# Patient Record
Sex: MALE | Race: WHITE | HISPANIC OR LATINO | Employment: FULL TIME | URBAN - METROPOLITAN AREA
[De-identification: names, ages, dates, MRNs, and addresses within clinical notes are randomized per-mention and may not be internally consistent; named-entity substitution may affect disease eponyms.]

---

## 2020-10-14 ENCOUNTER — CASE MANAGEMENT (OUTPATIENT)
Dept: INTERNAL MEDICINE | Age: 45
End: 2020-10-14

## 2020-10-14 DIAGNOSIS — Z23 NEED FOR IMMUNIZATION AGAINST INFLUENZA: ICD-10-CM

## 2020-10-14 PROCEDURE — 90471 IMMUNIZATION ADMIN: CPT | Performed by: FAMILY MEDICINE

## 2020-10-14 PROCEDURE — 90686 IIV4 VACC NO PRSV 0.5 ML IM: CPT | Performed by: FAMILY MEDICINE

## 2020-11-19 ENCOUNTER — OFFICE VISIT (OUTPATIENT)
Dept: URGENT CARE | Age: 45
End: 2020-11-19
Payer: COMMERCIAL

## 2020-11-19 VITALS
HEART RATE: 73 BPM | OXYGEN SATURATION: 97 % | TEMPERATURE: 98.3 F | SYSTOLIC BLOOD PRESSURE: 122 MMHG | RESPIRATION RATE: 18 BRPM | HEIGHT: 70 IN | BODY MASS INDEX: 27.92 KG/M2 | DIASTOLIC BLOOD PRESSURE: 88 MMHG | WEIGHT: 195 LBS

## 2020-11-19 DIAGNOSIS — Z20.822 CLOSE EXPOSURE TO COVID-19 VIRUS: Primary | ICD-10-CM

## 2020-11-19 DIAGNOSIS — R19.7 DIARRHEA, UNSPECIFIED TYPE: ICD-10-CM

## 2020-11-19 PROCEDURE — U0003 INFECTIOUS AGENT DETECTION BY NUCLEIC ACID (DNA OR RNA); SEVERE ACUTE RESPIRATORY SYNDROME CORONAVIRUS 2 (SARS-COV-2) (CORONAVIRUS DISEASE [COVID-19]), AMPLIFIED PROBE TECHNIQUE, MAKING USE OF HIGH THROUGHPUT TECHNOLOGIES AS DESCRIBED BY CMS-2020-01-R: HCPCS | Performed by: NURSE PRACTITIONER

## 2020-11-19 PROCEDURE — G0382 LEV 3 HOSP TYPE B ED VISIT: HCPCS | Performed by: NURSE PRACTITIONER

## 2020-11-21 LAB — SARS-COV-2 RNA SPEC QL NAA+PROBE: DETECTED

## 2020-11-22 ENCOUNTER — TELEPHONE (OUTPATIENT)
Dept: URGENT CARE | Age: 45
End: 2020-11-22

## 2020-12-01 ENCOUNTER — HOSPITAL ENCOUNTER (EMERGENCY)
Facility: HOSPITAL | Age: 45
Discharge: HOME | End: 2020-12-01
Attending: EMERGENCY MEDICINE
Payer: COMMERCIAL

## 2020-12-01 VITALS
OXYGEN SATURATION: 100 % | HEART RATE: 72 BPM | TEMPERATURE: 98.1 F | BODY MASS INDEX: 28.06 KG/M2 | SYSTOLIC BLOOD PRESSURE: 132 MMHG | RESPIRATION RATE: 18 BRPM | DIASTOLIC BLOOD PRESSURE: 93 MMHG | HEIGHT: 70 IN | WEIGHT: 195.99 LBS

## 2020-12-01 DIAGNOSIS — Z20.822 ENCOUNTER FOR LABORATORY TESTING FOR COVID-19 VIRUS: Primary | ICD-10-CM

## 2020-12-01 PROCEDURE — U0003 INFECTIOUS AGENT DETECTION BY NUCLEIC ACID (DNA OR RNA); SEVERE ACUTE RESPIRATORY SYNDROME CORONAVIRUS 2 (SARS-COV-2) (CORONAVIRUS DISEASE [COVID-19]), AMPLIFIED PROBE TECHNIQUE, MAKING USE OF HIGH THROUGHPUT TECHNOLOGIES AS DESCRIBED BY CMS-2020-01-R: HCPCS | Performed by: EMERGENCY MEDICINE

## 2020-12-01 PROCEDURE — 99283 EMERGENCY DEPT VISIT LOW MDM: CPT

## 2020-12-01 PROCEDURE — 99282 EMERGENCY DEPT VISIT SF MDM: CPT | Performed by: EMERGENCY MEDICINE

## 2020-12-02 LAB — SARS-COV-2 RNA SPEC QL NAA+PROBE: DETECTED

## 2021-11-12 ENCOUNTER — WALK IN (OUTPATIENT)
Dept: URGENT CARE | Age: 46
End: 2021-11-12

## 2021-11-12 VITALS
DIASTOLIC BLOOD PRESSURE: 62 MMHG | BODY MASS INDEX: 23.8 KG/M2 | SYSTOLIC BLOOD PRESSURE: 114 MMHG | HEIGHT: 71 IN | OXYGEN SATURATION: 98 % | TEMPERATURE: 98.2 F | WEIGHT: 170 LBS | HEART RATE: 66 BPM

## 2021-11-12 DIAGNOSIS — H10.12 ACUTE ATOPIC CONJUNCTIVITIS OF LEFT EYE: Primary | ICD-10-CM

## 2021-11-12 PROCEDURE — 99213 OFFICE O/P EST LOW 20 MIN: CPT | Performed by: NURSE PRACTITIONER

## 2021-11-12 RX ORDER — OFLOXACIN 3 MG/ML
2 SOLUTION/ DROPS OPHTHALMIC 4 TIMES DAILY
Qty: 5 ML | Refills: 0 | Status: SHIPPED | OUTPATIENT
Start: 2021-11-12 | End: 2021-11-19

## 2021-11-12 ASSESSMENT — VISUAL ACUITY
OD_CC: 20/25
OS_CC: 20/25

## 2025-04-25 ENCOUNTER — HOSPITAL ENCOUNTER (INPATIENT)
Facility: HOSPITAL | Age: 50
LOS: 3 days | Discharge: HOME | DRG: 419 | End: 2025-04-28
Attending: EMERGENCY MEDICINE | Admitting: SURGERY

## 2025-04-25 ENCOUNTER — APPOINTMENT (EMERGENCY)
Dept: ULTRASOUND IMAGING | Facility: HOSPITAL | Age: 50
DRG: 419 | End: 2025-04-25

## 2025-04-25 ENCOUNTER — APPOINTMENT (EMERGENCY)
Dept: CT IMAGING | Facility: HOSPITAL | Age: 50
DRG: 419 | End: 2025-04-25

## 2025-04-25 DIAGNOSIS — R94.31 ABNORMAL EKG: ICD-10-CM

## 2025-04-25 DIAGNOSIS — K80.50 CHOLEDOCHOLITHIASIS: ICD-10-CM

## 2025-04-25 DIAGNOSIS — G89.18 ACUTE POST-OPERATIVE PAIN: ICD-10-CM

## 2025-04-25 DIAGNOSIS — K80.42 CHOLEDOCHOLITHIASIS WITH ACUTE CHOLECYSTITIS: ICD-10-CM

## 2025-04-25 DIAGNOSIS — R10.13 EPIGASTRIC PAIN: Primary | ICD-10-CM

## 2025-04-25 LAB
ALBUMIN SERPL BCG-MCNC: 4.4 G/DL (ref 3.5–5)
ALP SERPL-CCNC: 71 U/L (ref 34–104)
ALT SERPL W P-5'-P-CCNC: 45 U/L (ref 7–52)
ANION GAP SERPL CALCULATED.3IONS-SCNC: 9 MMOL/L (ref 4–13)
AST SERPL W P-5'-P-CCNC: 59 U/L (ref 13–39)
BASOPHILS # BLD AUTO: 0.08 THOUSANDS/ÂΜL (ref 0–0.1)
BASOPHILS NFR BLD AUTO: 1 % (ref 0–1)
BILIRUB SERPL-MCNC: 1.45 MG/DL (ref 0.2–1)
BUN SERPL-MCNC: 13 MG/DL (ref 5–25)
CALCIUM SERPL-MCNC: 9.7 MG/DL (ref 8.4–10.2)
CARDIAC TROPONIN I PNL SERPL HS: 3 NG/L (ref ?–50)
CHLORIDE SERPL-SCNC: 103 MMOL/L (ref 96–108)
CO2 SERPL-SCNC: 29 MMOL/L (ref 21–32)
CREAT SERPL-MCNC: 1.32 MG/DL (ref 0.6–1.3)
EOSINOPHIL # BLD AUTO: 0.28 THOUSAND/ÂΜL (ref 0–0.61)
EOSINOPHIL NFR BLD AUTO: 2 % (ref 0–6)
ERYTHROCYTE [DISTWIDTH] IN BLOOD BY AUTOMATED COUNT: 12.7 % (ref 11.6–15.1)
GFR SERPL CREATININE-BSD FRML MDRD: 62 ML/MIN/1.73SQ M
GLUCOSE SERPL-MCNC: 112 MG/DL (ref 65–140)
HCT VFR BLD AUTO: 48.2 % (ref 36.5–49.3)
HGB BLD-MCNC: 15.9 G/DL (ref 12–17)
IMM GRANULOCYTES # BLD AUTO: 0.06 THOUSAND/UL (ref 0–0.2)
IMM GRANULOCYTES NFR BLD AUTO: 0 % (ref 0–2)
LIPASE SERPL-CCNC: 70 U/L (ref 11–82)
LYMPHOCYTES # BLD AUTO: 2.89 THOUSANDS/ÂΜL (ref 0.6–4.47)
LYMPHOCYTES NFR BLD AUTO: 20 % (ref 14–44)
MCH RBC QN AUTO: 30.2 PG (ref 26.8–34.3)
MCHC RBC AUTO-ENTMCNC: 33 G/DL (ref 31.4–37.4)
MCV RBC AUTO: 92 FL (ref 82–98)
MONOCYTES # BLD AUTO: 0.8 THOUSAND/ÂΜL (ref 0.17–1.22)
MONOCYTES NFR BLD AUTO: 6 % (ref 4–12)
NEUTROPHILS # BLD AUTO: 10.5 THOUSANDS/ÂΜL (ref 1.85–7.62)
NEUTS SEG NFR BLD AUTO: 71 % (ref 43–75)
NRBC BLD AUTO-RTO: 0 /100 WBCS
PLATELET # BLD AUTO: 354 THOUSANDS/UL (ref 149–390)
PMV BLD AUTO: 9.9 FL (ref 8.9–12.7)
POTASSIUM SERPL-SCNC: 4 MMOL/L (ref 3.5–5.3)
PROT SERPL-MCNC: 7.9 G/DL (ref 6.4–8.4)
RBC # BLD AUTO: 5.26 MILLION/UL (ref 3.88–5.62)
SODIUM SERPL-SCNC: 141 MMOL/L (ref 135–147)
WBC # BLD AUTO: 14.61 THOUSAND/UL (ref 4.31–10.16)

## 2025-04-25 PROCEDURE — 99284 EMERGENCY DEPT VISIT MOD MDM: CPT

## 2025-04-25 PROCEDURE — 96374 THER/PROPH/DIAG INJ IV PUSH: CPT

## 2025-04-25 PROCEDURE — 36415 COLL VENOUS BLD VENIPUNCTURE: CPT

## 2025-04-25 PROCEDURE — 84484 ASSAY OF TROPONIN QUANT: CPT

## 2025-04-25 PROCEDURE — 96361 HYDRATE IV INFUSION ADD-ON: CPT

## 2025-04-25 PROCEDURE — 83690 ASSAY OF LIPASE: CPT

## 2025-04-25 PROCEDURE — 80053 COMPREHEN METABOLIC PANEL: CPT

## 2025-04-25 PROCEDURE — 85025 COMPLETE CBC W/AUTO DIFF WBC: CPT

## 2025-04-25 PROCEDURE — 96375 TX/PRO/DX INJ NEW DRUG ADDON: CPT

## 2025-04-25 PROCEDURE — 74177 CT ABD & PELVIS W/CONTRAST: CPT

## 2025-04-25 PROCEDURE — 76705 ECHO EXAM OF ABDOMEN: CPT

## 2025-04-25 PROCEDURE — 93005 ELECTROCARDIOGRAM TRACING: CPT

## 2025-04-25 RX ORDER — HYDROMORPHONE HCL/PF 1 MG/ML
0.5 SYRINGE (ML) INJECTION ONCE
Status: COMPLETED | OUTPATIENT
Start: 2025-04-25 | End: 2025-04-25

## 2025-04-25 RX ORDER — HEPARIN SODIUM 5000 [USP'U]/ML
5000 INJECTION, SOLUTION INTRAVENOUS; SUBCUTANEOUS EVERY 8 HOURS SCHEDULED
Status: DISCONTINUED | OUTPATIENT
Start: 2025-04-25 | End: 2025-04-28 | Stop reason: HOSPADM

## 2025-04-25 RX ORDER — SUCRALFATE 1 G/1
1 TABLET ORAL 4 TIMES DAILY
Qty: 30 TABLET | Refills: 0 | Status: SHIPPED | OUTPATIENT
Start: 2025-04-25

## 2025-04-25 RX ORDER — ONDANSETRON 2 MG/ML
4 INJECTION INTRAMUSCULAR; INTRAVENOUS ONCE
Status: COMPLETED | OUTPATIENT
Start: 2025-04-25 | End: 2025-04-25

## 2025-04-25 RX ORDER — METRONIDAZOLE 500 MG/100ML
500 INJECTION, SOLUTION INTRAVENOUS EVERY 12 HOURS
Status: DISCONTINUED | OUTPATIENT
Start: 2025-04-25 | End: 2025-04-27

## 2025-04-25 RX ORDER — OXYCODONE HYDROCHLORIDE 10 MG/1
10 TABLET ORAL EVERY 4 HOURS PRN
Refills: 0 | Status: DISCONTINUED | OUTPATIENT
Start: 2025-04-25 | End: 2025-04-28 | Stop reason: HOSPADM

## 2025-04-25 RX ORDER — FAMOTIDINE 20 MG/1
20 TABLET, FILM COATED ORAL 2 TIMES DAILY
Qty: 30 TABLET | Refills: 0 | Status: SHIPPED | OUTPATIENT
Start: 2025-04-25

## 2025-04-25 RX ORDER — ACETAMINOPHEN 10 MG/ML
1000 INJECTION, SOLUTION INTRAVENOUS ONCE
Status: COMPLETED | OUTPATIENT
Start: 2025-04-25 | End: 2025-04-25

## 2025-04-25 RX ORDER — FAMOTIDINE 10 MG/ML
20 INJECTION, SOLUTION INTRAVENOUS ONCE
Status: COMPLETED | OUTPATIENT
Start: 2025-04-25 | End: 2025-04-25

## 2025-04-25 RX ORDER — OXYCODONE HYDROCHLORIDE 5 MG/1
5 TABLET ORAL EVERY 4 HOURS PRN
Refills: 0 | Status: DISCONTINUED | OUTPATIENT
Start: 2025-04-25 | End: 2025-04-28 | Stop reason: HOSPADM

## 2025-04-25 RX ORDER — ONDANSETRON 2 MG/ML
4 INJECTION INTRAMUSCULAR; INTRAVENOUS EVERY 6 HOURS PRN
Status: DISCONTINUED | OUTPATIENT
Start: 2025-04-25 | End: 2025-04-28 | Stop reason: HOSPADM

## 2025-04-25 RX ORDER — HYDROMORPHONE HCL IN WATER/PF 6 MG/30 ML
0.2 PATIENT CONTROLLED ANALGESIA SYRINGE INTRAVENOUS EVERY 4 HOURS PRN
Refills: 0 | Status: DISCONTINUED | OUTPATIENT
Start: 2025-04-25 | End: 2025-04-28 | Stop reason: HOSPADM

## 2025-04-25 RX ORDER — MAGNESIUM HYDROXIDE/ALUMINUM HYDROXICE/SIMETHICONE 120; 1200; 1200 MG/30ML; MG/30ML; MG/30ML
30 SUSPENSION ORAL ONCE
Status: COMPLETED | OUTPATIENT
Start: 2025-04-25 | End: 2025-04-25

## 2025-04-25 RX ORDER — ACETAMINOPHEN 325 MG/1
975 TABLET ORAL EVERY 8 HOURS SCHEDULED
Status: DISCONTINUED | OUTPATIENT
Start: 2025-04-25 | End: 2025-04-28 | Stop reason: HOSPADM

## 2025-04-25 RX ORDER — SODIUM CHLORIDE, SODIUM LACTATE, POTASSIUM CHLORIDE, CALCIUM CHLORIDE 600; 310; 30; 20 MG/100ML; MG/100ML; MG/100ML; MG/100ML
125 INJECTION, SOLUTION INTRAVENOUS CONTINUOUS
Status: DISCONTINUED | OUTPATIENT
Start: 2025-04-25 | End: 2025-04-27

## 2025-04-25 RX ORDER — KETOROLAC TROMETHAMINE 30 MG/ML
15 INJECTION, SOLUTION INTRAMUSCULAR; INTRAVENOUS ONCE
Status: DISCONTINUED | OUTPATIENT
Start: 2025-04-25 | End: 2025-04-25

## 2025-04-25 RX ADMIN — HYDROMORPHONE HYDROCHLORIDE 0.5 MG: 1 INJECTION, SOLUTION INTRAMUSCULAR; INTRAVENOUS; SUBCUTANEOUS at 18:13

## 2025-04-25 RX ADMIN — FAMOTIDINE 20 MG: 10 INJECTION, SOLUTION INTRAVENOUS at 18:25

## 2025-04-25 RX ADMIN — ALUMINUM HYDROXIDE, MAGNESIUM HYDROXIDE, AND DIMETHICONE 30 ML: 200; 20; 200 SUSPENSION ORAL at 18:26

## 2025-04-25 RX ADMIN — HYDROMORPHONE HYDROCHLORIDE 0.2 MG: 0.2 INJECTION, SOLUTION INTRAMUSCULAR; INTRAVENOUS; SUBCUTANEOUS at 23:00

## 2025-04-25 RX ADMIN — ONDANSETRON 4 MG: 2 INJECTION INTRAMUSCULAR; INTRAVENOUS at 18:13

## 2025-04-25 RX ADMIN — IOHEXOL 100 ML: 350 INJECTION, SOLUTION INTRAVENOUS at 19:47

## 2025-04-25 RX ADMIN — SODIUM CHLORIDE 1000 ML: 0.9 INJECTION, SOLUTION INTRAVENOUS at 18:25

## 2025-04-25 RX ADMIN — ACETAMINOPHEN 1000 MG: 10 INJECTION INTRAVENOUS at 18:26

## 2025-04-26 ENCOUNTER — APPOINTMENT (INPATIENT)
Dept: MRI IMAGING | Facility: HOSPITAL | Age: 50
DRG: 419 | End: 2025-04-26

## 2025-04-26 ENCOUNTER — APPOINTMENT (INPATIENT)
Dept: RADIOLOGY | Facility: HOSPITAL | Age: 50
DRG: 419 | End: 2025-04-26

## 2025-04-26 PROBLEM — K80.50 CHOLEDOCHOLITHIASIS: Status: ACTIVE | Noted: 2025-04-26

## 2025-04-26 LAB
ALBUMIN SERPL BCG-MCNC: 3.7 G/DL (ref 3.5–5)
ALP SERPL-CCNC: 97 U/L (ref 34–104)
ALT SERPL W P-5'-P-CCNC: 327 U/L (ref 7–52)
ANION GAP SERPL CALCULATED.3IONS-SCNC: 4 MMOL/L (ref 4–13)
AST SERPL W P-5'-P-CCNC: 343 U/L (ref 13–39)
BASOPHILS # BLD AUTO: 0.07 THOUSANDS/ÂΜL (ref 0–0.1)
BASOPHILS NFR BLD AUTO: 1 % (ref 0–1)
BILIRUB SERPL-MCNC: 1.25 MG/DL (ref 0.2–1)
BUN SERPL-MCNC: 12 MG/DL (ref 5–25)
CALCIUM SERPL-MCNC: 8.3 MG/DL (ref 8.4–10.2)
CHLORIDE SERPL-SCNC: 107 MMOL/L (ref 96–108)
CO2 SERPL-SCNC: 28 MMOL/L (ref 21–32)
CREAT SERPL-MCNC: 1.24 MG/DL (ref 0.6–1.3)
EOSINOPHIL # BLD AUTO: 0.21 THOUSAND/ÂΜL (ref 0–0.61)
EOSINOPHIL NFR BLD AUTO: 2 % (ref 0–6)
ERYTHROCYTE [DISTWIDTH] IN BLOOD BY AUTOMATED COUNT: 12.9 % (ref 11.6–15.1)
GFR SERPL CREATININE-BSD FRML MDRD: 67 ML/MIN/1.73SQ M
GLUCOSE SERPL-MCNC: 82 MG/DL (ref 65–140)
HCT VFR BLD AUTO: 42.6 % (ref 36.5–49.3)
HGB BLD-MCNC: 13.9 G/DL (ref 12–17)
IMM GRANULOCYTES # BLD AUTO: 0.03 THOUSAND/UL (ref 0–0.2)
IMM GRANULOCYTES NFR BLD AUTO: 0 % (ref 0–2)
LYMPHOCYTES # BLD AUTO: 3.54 THOUSANDS/ÂΜL (ref 0.6–4.47)
LYMPHOCYTES NFR BLD AUTO: 34 % (ref 14–44)
MAGNESIUM SERPL-MCNC: 2.2 MG/DL (ref 1.9–2.7)
MCH RBC QN AUTO: 30.8 PG (ref 26.8–34.3)
MCHC RBC AUTO-ENTMCNC: 32.6 G/DL (ref 31.4–37.4)
MCV RBC AUTO: 95 FL (ref 82–98)
MONOCYTES # BLD AUTO: 0.71 THOUSAND/ÂΜL (ref 0.17–1.22)
MONOCYTES NFR BLD AUTO: 7 % (ref 4–12)
NEUTROPHILS # BLD AUTO: 5.75 THOUSANDS/ÂΜL (ref 1.85–7.62)
NEUTS SEG NFR BLD AUTO: 56 % (ref 43–75)
NRBC BLD AUTO-RTO: 0 /100 WBCS
PLATELET # BLD AUTO: 281 THOUSANDS/UL (ref 149–390)
PMV BLD AUTO: 10.4 FL (ref 8.9–12.7)
POTASSIUM SERPL-SCNC: 4 MMOL/L (ref 3.5–5.3)
PROT SERPL-MCNC: 6.1 G/DL (ref 6.4–8.4)
RBC # BLD AUTO: 4.51 MILLION/UL (ref 3.88–5.62)
SODIUM SERPL-SCNC: 139 MMOL/L (ref 135–147)
WBC # BLD AUTO: 10.31 THOUSAND/UL (ref 4.31–10.16)

## 2025-04-26 PROCEDURE — 80053 COMPREHEN METABOLIC PANEL: CPT

## 2025-04-26 PROCEDURE — 99254 IP/OBS CNSLTJ NEW/EST MOD 60: CPT | Performed by: INTERNAL MEDICINE

## 2025-04-26 PROCEDURE — 74181 MRI ABDOMEN W/O CONTRAST: CPT

## 2025-04-26 PROCEDURE — 83735 ASSAY OF MAGNESIUM: CPT

## 2025-04-26 PROCEDURE — 85025 COMPLETE CBC W/AUTO DIFF WBC: CPT

## 2025-04-26 RX ORDER — CEFAZOLIN SODIUM 2 G/50ML
2000 SOLUTION INTRAVENOUS
Status: DISCONTINUED | OUTPATIENT
Start: 2025-04-27 | End: 2025-04-27

## 2025-04-26 RX ADMIN — METRONIDAZOLE 500 MG: 500 INJECTION, SOLUTION INTRAVENOUS at 22:27

## 2025-04-26 RX ADMIN — ACETAMINOPHEN 975 MG: 325 TABLET, FILM COATED ORAL at 00:00

## 2025-04-26 RX ADMIN — ACETAMINOPHEN 975 MG: 325 TABLET, FILM COATED ORAL at 06:23

## 2025-04-26 RX ADMIN — HEPARIN SODIUM 5000 UNITS: 5000 INJECTION INTRAVENOUS; SUBCUTANEOUS at 00:01

## 2025-04-26 RX ADMIN — SODIUM CHLORIDE, SODIUM LACTATE, POTASSIUM CHLORIDE, AND CALCIUM CHLORIDE 125 ML/HR: .6; .31; .03; .02 INJECTION, SOLUTION INTRAVENOUS at 20:15

## 2025-04-26 RX ADMIN — METRONIDAZOLE 500 MG: 500 INJECTION, SOLUTION INTRAVENOUS at 10:29

## 2025-04-26 RX ADMIN — HEPARIN SODIUM 5000 UNITS: 5000 INJECTION INTRAVENOUS; SUBCUTANEOUS at 06:23

## 2025-04-26 RX ADMIN — OXYCODONE HYDROCHLORIDE 10 MG: 10 TABLET ORAL at 00:00

## 2025-04-26 RX ADMIN — SODIUM CHLORIDE, SODIUM LACTATE, POTASSIUM CHLORIDE, AND CALCIUM CHLORIDE 125 ML/HR: .6; .31; .03; .02 INJECTION, SOLUTION INTRAVENOUS at 00:01

## 2025-04-26 RX ADMIN — HEPARIN SODIUM 5000 UNITS: 5000 INJECTION INTRAVENOUS; SUBCUTANEOUS at 13:09

## 2025-04-26 RX ADMIN — CEFTRIAXONE SODIUM 1000 MG: 10 INJECTION, POWDER, FOR SOLUTION INTRAVENOUS at 00:49

## 2025-04-26 RX ADMIN — METRONIDAZOLE 500 MG: 500 INJECTION, SOLUTION INTRAVENOUS at 00:01

## 2025-04-26 RX ADMIN — ACETAMINOPHEN 975 MG: 325 TABLET, FILM COATED ORAL at 13:08

## 2025-04-26 RX ADMIN — OXYCODONE HYDROCHLORIDE 10 MG: 10 TABLET ORAL at 08:26

## 2025-04-26 RX ADMIN — OXYCODONE HYDROCHLORIDE 10 MG: 10 TABLET ORAL at 20:15

## 2025-04-26 RX ADMIN — SODIUM CHLORIDE, SODIUM LACTATE, POTASSIUM CHLORIDE, AND CALCIUM CHLORIDE 125 ML/HR: .6; .31; .03; .02 INJECTION, SOLUTION INTRAVENOUS at 10:27

## 2025-04-26 NOTE — ED CARE HANDOFF
Emergency Department Sign Out Note        Sign out and transfer of care from Dr. Payan. See Separate Emergency Department note.     The patient, Hebert Fischer, was evaluated by the previous provider for upper abdominal pain.    Workup Completed:  Labs, symptomatic treatment    ED Course / Workup Pending (followup):  CT abdomen pelvis, disposition        No data recorded                          ED Course as of 04/26/25 0222 Fri Apr 25, 2025 2104 SO: epigastric abd pain, dry heaving. Pending CT scan. Improved symptoms. Referral to GI if d/c home.   2132 CT abdomen pelvis with contrast  IMPRESSION:     Punctate (1 to 2 mm) calculus in the region of the ampulla.  Trace intrahepatic biliary dilation. Normal CBD caliber.  Note of elevated bilirubin on today's labs.  GI consultation recommended.     Mild pericholecystic fluid and trace gallbladder wall thickening.  Clinical correlation advised for acute cholecystitis, right upper quadrant ultrasound could be considered.     Findings were discussed with Dr. Hayes from the ED at 9:20 p.m. on 4/25/2025 2233 Surgery to see   2256 US right upper quadrant  IMPRESSION:     Gallbladder wall thickening and pericholecystic fluid with a positive sonographic English's sign suspicious for acute cholecystitis.     Punctate calculus in the region of the ampulla described on CT abdomen and pelvis performed earlier is not visualized on this study. GI consultation is recommended.     Workstation performed: [id number]       Procedures  Medical Decision Making  Amount and/or Complexity of Data Reviewed  Labs: ordered.  Radiology: ordered. Decision-making details documented in ED Course.    Risk  OTC drugs.  Prescription drug management.  Decision regarding hospitalization.            Disposition  Final diagnoses:   Epigastric pain   Choledocholithiasis     Time reflects when diagnosis was documented in both MDM as applicable and the Disposition within this note       Time User  Action Codes Description Comment    4/25/2025  9:14 PM Hafsa Payan Add [R10.13] Epigastric pain     4/25/2025 10:45 PM Jay Wilson Add [K80.42] Choledocholithiasis with acute cholecystitis     4/25/2025 10:58 PM Ariela Coburn Add [K80.50] Choledocholithiasis           ED Disposition       ED Disposition   Admit    Condition   Stable    Date/Time   Fri Apr 25, 2025 10:58 PM    Comment   Case was discussed with Surgery and the patient's admission status was agreed to be Admission Status: inpatient status to the service of Dr. Brambila .               Follow-up Information       Follow up With Specialties Details Why Contact Info Additional Information    [facility] Schedule an appointment as soon as possible for a visit   [address] [facility] [address] [facility] [address]  [address]  [facility] [address] [phone] [facility] [address], [facility] [address], [place], [place], [address]   [phone]     [facility] Emergency Department Emergency Medicine Go to  If symptoms worsen [address] [facility] [address] [phone] [facility] Emergency Department, [facility] [place], [place], [address]          Current Discharge Medication List        START taking these medications    Details   famotidine (PEPCID) 20 mg tablet Take 1 tablet (20 mg total) by mouth 2 (two) times a day  Qty: 30 tablet, Refills: 0    Associated Diagnoses: Epigastric pain      sucralfate (CARAFATE) 1 g tablet Take 1 tablet (1 g total) by mouth 4 (four) times a day  Qty: 30 tablet, Refills: 0    Associated Diagnoses: Epigastric pain           CONTINUE these medications which have NOT CHANGED    Details   hydrOXYzine HCL (ATARAX) 25 mg tablet Take 1 tablet by mouth every 6 (six) hours  Qty: 12 tablet, Refills: 0                  ED Provider  Electronically Signed by     Ariela Coburn,  DO  04/26/25 0222

## 2025-04-26 NOTE — ED PROVIDER NOTES
Time reflects when diagnosis was documented in both MDM as applicable and the Disposition within this note       Time User Action Codes Description Comment    4/25/2025  9:14 PM Hafsa Payan Add [R10.13] Epigastric pain     4/25/2025 10:45 PM Jay Wilson Add [K80.42] Choledocholithiasis with acute cholecystitis     4/25/2025 10:58 PM Ariela Coburn Add [K80.50] Choledocholithiasis           ED Disposition       ED Disposition   Admit    Condition   Stable    Date/Time   Fri Apr 25, 2025 10:58 PM    Comment   Case was discussed with Surgery and the patient's admission status was agreed to be Admission Status: inpatient status to the service of Dr. Brambila .               Assessment & Plan       Medical Decision Making  50-year-old male patient presenting with epigastric abdominal pain and nausea.  On initial evaluation, he appeared quite ill and was tachycardic, crying, with some mild diaphoresis.  He did have some upper abdominal tenderness to palpation without guarding or rebound phenomenon.  Given degree of discomfort, he was immediately given medication for pain and nausea for supportive care.  Labs are drawn, EKG done.  EKG showed possible delta wave, but this is likely unrelated to his current presentation.  No acute ischemic changes were noted otherwise.  Labs showed mild elevation in hepatobiliary function test and CT scan of the abdomen and pelvis was done.  He did have significant improvement after administration of pain medication.  CT scan was pending at time of signout to Ariela Coburn, DO and disposition will depend on clinical improvement and any pathology discovered on imaging.    Amount and/or Complexity of Data Reviewed  Labs: ordered. Decision-making details documented in ED Course.  Radiology: ordered.    Risk  OTC drugs.  Prescription drug management.  Decision regarding hospitalization.        ED Course as of 04/26/25 1625   Fri Apr 25, 2025 2000 Creatinine(!): 1.32  Unclear if CKD or  acute change - no prior for comparison.       Medications   lactated ringers infusion (has no administration in time range)   heparin (porcine) subcutaneous injection 5,000 Units (has no administration in time range)   ondansetron (ZOFRAN) injection 4 mg (has no administration in time range)   acetaminophen (TYLENOL) tablet 975 mg (has no administration in time range)   oxyCODONE (ROXICODONE) IR tablet 5 mg (has no administration in time range)   oxyCODONE (ROXICODONE) immediate release tablet 10 mg (has no administration in time range)   HYDROmorphone HCl (DILAUDID) injection 0.2 mg (0.2 mg Intravenous Given 4/25/25 2300)   ceftriaxone (ROCEPHIN) 1 g/50 mL in dextrose IVPB (has no administration in time range)   metroNIDAZOLE (FLAGYL) IVPB (premix) 500 mg 100 mL (has no administration in time range)   sodium chloride 0.9 % bolus 1,000 mL (0 mL Intravenous Stopped 4/25/25 1925)   ondansetron (ZOFRAN) injection 4 mg (4 mg Intravenous Given 4/25/25 1813)   HYDROmorphone (DILAUDID) injection 0.5 mg (0.5 mg Intravenous Given 4/25/25 1813)   acetaminophen (Ofirmev) injection 1,000 mg (0 mg Intravenous Stopped 4/25/25 1841)   aluminum-magnesium hydroxide-simethicone (MAALOX) oral suspension 30 mL (30 mL Oral Given 4/25/25 1826)   Famotidine (PF) (PEPCID) injection 20 mg (20 mg Intravenous Given 4/25/25 1825)   iohexol (OMNIPAQUE) 350 MG/ML injection (MULTI-DOSE) 100 mL (100 mL Intravenous Given 4/25/25 1947)       ED Risk Strat Scores                    No data recorded        SBIRT 20yo+      Flowsheet Row Most Recent Value   Initial Alcohol Screen: US AUDIT-C     1. How often do you have a drink containing alcohol? 0 Filed at: 04/25/2025 1749   2. How many drinks containing alcohol do you have on a typical day you are drinking?  0 Filed at: 04/25/2025 1749   3a. Male UNDER 65: How often do you have five or more drinks on one occasion? 0 Filed at: 04/25/2025 1749   Audit-C Score 0 Filed at: 04/25/2025 1749   JERILYN: How  many times in the past year have you...    Used an illegal drug or used a prescription medication for non-medical reasons? Never Filed at: 04/25/2025 7395                            History of Present Illness       Chief Complaint   Patient presents with    Vomiting     Pts wife states pt ate a sandwich with eggs around 230pm and started vomiting after. C/o mid abd pain. Pt tearful, unable to sit still during triage.     Abdominal Pain       History reviewed. No pertinent past medical history.   History reviewed. No pertinent surgical history.   History reviewed. No pertinent family history.   Social History     Tobacco Use    Smoking status: Former    Smokeless tobacco: Never   Vaping Use    Vaping status: Never Used   Substance Use Topics    Alcohol use: No    Drug use: No      E-Cigarette/Vaping    E-Cigarette Use Never User       E-Cigarette/Vaping Substances      I have reviewed and agree with the history as documented.     HPI    Patient is a 50-year-old male with noncontributory past medical history presenting with worsening abdominal pain and nausea.  He states the pain started this afternoon around 3 PM after eating an egg salad sandwich for lunch.  He began having severe, burning epigastric/upper abdominal pain that has been unrelenting.  He is nauseous and not able to tolerate p.o. intake.  Denies any diarrhea or fevers.  Denies previous abdominal surgeries.    Review of Systems   Constitutional:  Positive for appetite change and diaphoresis. Negative for chills and fever.   HENT:  Negative for ear pain and sore throat.    Eyes:  Negative for pain and visual disturbance.   Respiratory:  Negative for cough and shortness of breath.    Cardiovascular:  Negative for chest pain and palpitations.   Gastrointestinal:  Positive for abdominal pain and nausea. Negative for vomiting.   Genitourinary:  Negative for dysuria and hematuria.   Musculoskeletal:  Negative for arthralgias and back pain.   Skin:  Negative  for color change and rash.   Neurological:  Negative for seizures and syncope.   All other systems reviewed and are negative.          Objective       ED Triage Vitals   Temperature Pulse Blood Pressure Respirations SpO2 Patient Position - Orthostatic VS   04/25/25 1750 04/25/25 1749 04/25/25 1749 04/25/25 1749 04/25/25 1749 04/25/25 1749   98.7 °F (37.1 °C) 97 154/95 20 100 % Sitting      Temp Source Heart Rate Source BP Location FiO2 (%) Pain Score    04/25/25 1750 04/25/25 1749 04/25/25 1749 -- 04/25/25 1749    Oral Monitor Left arm  10 - Worst Possible Pain      Vitals      Date and Time Temp Pulse SpO2 Resp BP Pain Score FACES Pain Rating User   04/26/25 1548 98.2 °F (36.8 °C) 50 96 % 18 123/70 -- -- DII   04/26/25 1021 98.1 °F (36.7 °C) 46 97 % 18 119/72 -- -- DII   04/26/25 0826 -- -- -- -- -- 7 -- DD   04/26/25 0705 -- -- -- -- -- 7 -- DD   04/26/25 0623 -- -- -- -- -- 3 -- DD   04/26/25 0000 -- -- -- -- -- No Pain -- CG   04/25/25 2255 97.9 °F (36.6 °C) 58 99 % 18 127/81 -- -- JF   04/25/25 1930 -- 63 97 % 20 124/81 -- -- LO   04/25/25 1900 -- 64 98 % 20 132/88 -- -- LO   04/25/25 1813 -- -- -- -- -- 10 - Worst Possible Pain -- KM   04/25/25 1750 98.7 °F (37.1 °C) -- -- -- -- -- -- LS   04/25/25 1749 -- 97 100 % 20 154/95 10 - Worst Possible Pain -- LS            Physical Exam  Vitals and nursing note reviewed.   Constitutional:       General: He is in acute distress.      Appearance: He is well-developed. He is ill-appearing.   HENT:      Head: Normocephalic and atraumatic.   Eyes:      Conjunctiva/sclera: Conjunctivae normal.   Cardiovascular:      Rate and Rhythm: Regular rhythm. Tachycardia present.      Heart sounds: No murmur heard.  Pulmonary:      Effort: Pulmonary effort is normal. No respiratory distress.      Breath sounds: Normal breath sounds.   Abdominal:      General: Abdomen is flat.      Palpations: Abdomen is soft.      Tenderness: There is abdominal tenderness in the right upper quadrant  and epigastric area. There is no guarding or rebound.   Musculoskeletal:         General: No swelling.      Cervical back: Neck supple.   Skin:     General: Skin is warm and dry.   Neurological:      General: No focal deficit present.      Mental Status: He is alert.      Comments: Crying   Psychiatric:         Mood and Affect: Mood normal.         Results Reviewed       Procedure Component Value Units Date/Time    Comprehensive metabolic panel [[id number]]  (Abnormal) Collected: 04/26/25 0454    Lab Status: Final result Specimen: Blood from Arm, Left Updated: 04/26/25 0621     Sodium 139 mmol/L      Potassium 4.0 mmol/L      Chloride 107 mmol/L      CO2 28 mmol/L      ANION GAP 4 mmol/L      BUN 12 mg/dL      Creatinine 1.24 mg/dL      Glucose 82 mg/dL      Calcium 8.3 mg/dL       U/L       U/L      Alkaline Phosphatase 97 U/L      Total Protein 6.1 g/dL      Albumin 3.7 g/dL      Total Bilirubin 1.25 mg/dL      eGFR 67 ml/min/1.73sq m     Narrative:      National Kidney Disease Foundation guidelines for Chronic Kidney Disease (CKD):     Stage 1 with normal or high GFR (GFR > 90 mL/min/1.73 square meters)    Stage 2 Mild CKD (GFR = 60-89 mL/min/1.73 square meters)    Stage 3A Moderate CKD (GFR = 45-59 mL/min/1.73 square meters)    Stage 3B Moderate CKD (GFR = 30-44 mL/min/1.73 square meters)    Stage 4 Severe CKD (GFR = 15-29 mL/min/1.73 square meters)    Stage 5 End Stage CKD (GFR <15 mL/min/1.73 square meters)  Note: GFR calculation is accurate only with a steady state creatinine    Magnesium [[id number]]  (Normal) Collected: 04/26/25 0454    Lab Status: Final result Specimen: Blood from Arm, Left Updated: 04/26/25 0621     Magnesium 2.2 mg/dL     CBC and differential [[id number]]  (Abnormal) Collected: 04/26/25 0454    Lab Status: Final result Specimen: Blood from Arm, Left Updated: 04/26/25 0545     WBC 10.31 Thousand/uL      RBC 4.51 Million/uL      Hemoglobin 13.9 g/dL      Hematocrit 42.6 %       MCV 95 fL      MCH 30.8 pg      MCHC 32.6 g/dL      RDW 12.9 %      MPV 10.4 fL      Platelets 281 Thousands/uL      nRBC 0 /100 WBCs      Segmented % 56 %      Immature Grans % 0 %      Lymphocytes % 34 %      Monocytes % 7 %      Eosinophils Relative 2 %      Basophils Relative 1 %      Absolute Neutrophils 5.75 Thousands/µL      Absolute Immature Grans 0.03 Thousand/uL      Absolute Lymphocytes 3.54 Thousands/µL      Absolute Monocytes 0.71 Thousand/µL      Eosinophils Absolute 0.21 Thousand/µL      Basophils Absolute 0.07 Thousands/µL     HS Troponin 0hr (reflex protocol) [[id number]]  (Normal) Collected: 04/25/25 1816    Lab Status: Final result Specimen: Blood from Arm, Right Updated: 04/25/25 1851     hs TnI 0hr 3 ng/L     Comprehensive metabolic panel [[id number]]  (Abnormal) Collected: 04/25/25 1816    Lab Status: Final result Specimen: Blood from Arm, Right Updated: 04/25/25 1846     Sodium 141 mmol/L      Potassium 4.0 mmol/L      Chloride 103 mmol/L      CO2 29 mmol/L      ANION GAP 9 mmol/L      BUN 13 mg/dL      Creatinine 1.32 mg/dL      Glucose 112 mg/dL      Calcium 9.7 mg/dL      AST 59 U/L      ALT 45 U/L      Alkaline Phosphatase 71 U/L      Total Protein 7.9 g/dL      Albumin 4.4 g/dL      Total Bilirubin 1.45 mg/dL      eGFR 62 ml/min/1.73sq m     Narrative:      National Kidney Disease Foundation guidelines for Chronic Kidney Disease (CKD):     Stage 1 with normal or high GFR (GFR > 90 mL/min/1.73 square meters)    Stage 2 Mild CKD (GFR = 60-89 mL/min/1.73 square meters)    Stage 3A Moderate CKD (GFR = 45-59 mL/min/1.73 square meters)    Stage 3B Moderate CKD (GFR = 30-44 mL/min/1.73 square meters)    Stage 4 Severe CKD (GFR = 15-29 mL/min/1.73 square meters)    Stage 5 End Stage CKD (GFR <15 mL/min/1.73 square meters)  Note: GFR calculation is accurate only with a steady state creatinine    Lipase [[id number]]  (Normal) Collected: 04/25/25 1816    Lab Status: Final result Specimen: Blood from Arm,  Right Updated: 04/25/25 1846     Lipase 70 u/L     CBC and differential [[id number]]  (Abnormal) Collected: 04/25/25 1816    Lab Status: Final result Specimen: Blood from Arm, Right Updated: 04/25/25 1835     WBC 14.61 Thousand/uL      RBC 5.26 Million/uL      Hemoglobin 15.9 g/dL      Hematocrit 48.2 %      MCV 92 fL      MCH 30.2 pg      MCHC 33.0 g/dL      RDW 12.7 %      MPV 9.9 fL      Platelets 354 Thousands/uL      nRBC 0 /100 WBCs      Segmented % 71 %      Immature Grans % 0 %      Lymphocytes % 20 %      Monocytes % 6 %      Eosinophils Relative 2 %      Basophils Relative 1 %      Absolute Neutrophils 10.50 Thousands/µL      Absolute Immature Grans 0.06 Thousand/uL      Absolute Lymphocytes 2.89 Thousands/µL      Absolute Monocytes 0.80 Thousand/µL      Eosinophils Absolute 0.28 Thousand/µL      Basophils Absolute 0.08 Thousands/µL             MRI abdomen wo contrast and mrcp   Final Interpretation by Soco Vasquez MD (04/26 1157)      Mildly distended gallbladder with mild gallbladder wall thickening with pericholecystic fluid, suspicious for acute cholecystitis particularly given the positive sonographic English sign on right upper quadrant ultrasound 4/25/2025.      No biliary ductal dilatation or choledocholithiasis.         Workstation performed: [id number]         XR follow up   Final Interpretation by Chaz Wright MD (04/26 0714)      No radiopaque orbital foreign body.         Workstation performed: [id number]         US right upper quadrant   Final Interpretation by Edita Fortune MD (04/25 0476)      Gallbladder wall thickening and pericholecystic fluid with a positive sonographic English's sign suspicious for acute cholecystitis.      Punctate calculus in the region of the ampulla described on CT abdomen and pelvis performed earlier is not visualized on this study. GI consultation is recommended.      Workstation performed: [id number]         CT abdomen pelvis with contrast    Final Interpretation by Indio Loaiza MD (04/25 2124)      Punctate (1 to 2 mm) calculus in the region of the ampulla.   Trace intrahepatic biliary dilation. Normal CBD caliber.   Note of elevated bilirubin on today's labs.   GI consultation recommended.      Mild pericholecystic fluid and trace gallbladder wall thickening.   Clinical correlation advised for acute cholecystitis, right upper quadrant ultrasound could be considered.      Findings were discussed with Dr. Hayes from the ED at 9:20 p.m. on 4/25/2025            Workstation performed: [id number]             ECG 12 Lead Documentation Only    Date/Time: 4/25/2025 8:00 PM    Performed by: Hafsa Payan DO  Authorized by: Hafsa Payan DO    Indications / Diagnosis:  Abdominal pain  Patient location:  ED  Interpretation:     Interpretation: normal    Rate:     ECG rate:  72    ECG rate assessment: normal    Rhythm:     Rhythm: sinus rhythm    Ectopy:     Ectopy: none    QRS:     QRS axis:  Normal  Conduction:     Conduction: normal    ST segments:     ST segments:  Normal  T waves:     T waves: normal    Other findings:     Other findings: delta wave    Comments:      Possible appearance of delta wave in leads V3 through V6, II, I.       ED Medication and Procedure Management   Prior to Admission Medications   Prescriptions Last Dose Informant Patient Reported? Taking?   hydrOXYzine HCL (ATARAX) 25 mg tablet   No No   Sig: Take 1 tablet by mouth every 6 (six) hours   Patient not taking: Reported on 11/19/2020      Facility-Administered Medications: None     Current Discharge Medication List        START taking these medications    Details   famotidine (PEPCID) 20 mg tablet Take 1 tablet (20 mg total) by mouth 2 (two) times a day  Qty: 30 tablet, Refills: 0    Associated Diagnoses: Epigastric pain      sucralfate (CARAFATE) 1 g tablet Take 1 tablet (1 g total) by mouth 4 (four) times a day  Qty: 30 tablet, Refills:  0    Associated Diagnoses: Epigastric pain           CONTINUE these medications which have NOT CHANGED    Details   hydrOXYzine HCL (ATARAX) 25 mg tablet Take 1 tablet by mouth every 6 (six) hours  Qty: 12 tablet, Refills: 0             ED SEPSIS DOCUMENTATION   Time reflects when diagnosis was documented in both MDM as applicable and the Disposition within this note       Time User Action Codes Description Comment    4/25/2025  9:14 PM Hafsa Payan Add [R10.13] Epigastric pain     4/25/2025 10:45 PM Jay Wilson Add [K80.42] Choledocholithiasis with acute cholecystitis     4/25/2025 10:58 PM Ariela Coburn Add [K80.50] Choledocholithiasis                  Hafsa Payan DO  04/26/25 8845

## 2025-04-26 NOTE — H&P
H&P - Surgery-General   Name: Hebert Fischer 50 y.o. male I MRN: [id number]  Unit/Bed#: S -01 I Date of Admission: 4/25/2025   Date of Service: 4/26/2025 I Hospital Day: 1     Assessment & Plan  Choledocholithiasis  - Admit to general surgery  - NPO MN  - LR @ 125  - Rocephin/Flagyl  - GI consulted. MRCP pending  - Lap Angelica this admission  - DVT ppx with SQH  - Trend T bili, WBC  - Encourage OOB, ambulation, IS    History of Present Illness   Hebert Fischer is a 50 y.o. male who presents with one day of abdominal pain. The pain is located in his epigastrium. It does not radiate. No alleviating or aggravating factors. No prior similar episodes. PSH includes right inguinal hernia repair. Not on AC/AP medication. No recent EGD. Denies noticing jaundice, pale stool, or dark colored urine. Labs notable for leukocytosis and T Bilie of 1.4 CT AP obtained with punctate (1 to 2 mm) calculus in the region of the ampulla. Trace intrahepatic biliary dilation. Normal CBD caliber. Mild pericholecystic fluid and trace gallbladder wall thickening. On exam, abdomen is soft, tender in the epigastrium, no rebound or guarding.    Review of Systems  Review of systems negative except as per HPI.     Medical History Review: I have reviewed the patient's PMH, PSH, Social History, Family History, Meds, and Allergies   Historical Information   History reviewed. No pertinent past medical history.  History reviewed. No pertinent surgical history.  Social History     Tobacco Use    Smoking status: Former    Smokeless tobacco: Never   Vaping Use    Vaping status: Never Used   Substance and Sexual Activity    Alcohol use: No    Drug use: No    Sexual activity: Not on file     E-Cigarette/Vaping    E-Cigarette Use Never User      E-Cigarette/Vaping Substances     History reviewed. No pertinent family history.  Social History     Tobacco Use    Smoking status: Former    Smokeless tobacco: Never   Vaping Use    Vaping status: Never Used    Substance and Sexual Activity    Alcohol use: No    Drug use: No    Sexual activity: Not on file       Current Facility-Administered Medications:     acetaminophen (TYLENOL) tablet 975 mg, Q8H DOLORES    ceftriaxone (ROCEPHIN) 1 g/50 mL in dextrose IVPB, Q24H, Last Rate: 1,000 mg (04/26/25 0049)    heparin (porcine) subcutaneous injection 5,000 Units, Q8H DOLORES    HYDROmorphone HCl (DILAUDID) injection 0.2 mg, Q4H PRN    lactated ringers infusion, Continuous, Last Rate: 125 mL/hr (04/26/25 0001)    metroNIDAZOLE (FLAGYL) IVPB (premix) 500 mg 100 mL, Q12H, Last Rate: 500 mg (04/26/25 0001)    ondansetron (ZOFRAN) injection 4 mg, Q6H PRN    oxyCODONE (ROXICODONE) immediate release tablet 10 mg, Q4H PRN    oxyCODONE (ROXICODONE) IR tablet 5 mg, Q4H PRN  Prior to Admission Medications   Prescriptions Last Dose Informant Patient Reported? Taking?   hydrOXYzine HCL (ATARAX) 25 mg tablet   No No   Sig: Take 1 tablet by mouth every 6 (six) hours   Patient not taking: Reported on 11/19/2020      Facility-Administered Medications: None     Patient has no known allergies.    Objective :  Temp:  [97.9 °F (36.6 °C)-98.7 °F (37.1 °C)] 97.9 °F (36.6 °C)  HR:  [58-97] 58  BP: (124-154)/(81-95) 127/81  Resp:  [18-20] 18  SpO2:  [97 %-100 %] 99 %  O2 Device: None (Room air)      Physical Exam:  General: No acute distress, alert and oriented  CV: Well perfused, regular rate  Lungs: Normal work of breathing, no increased respiratory effort  Abdomen: Soft, tender in epigastrium, no rebound or guarding  Extremities: No edema, clubbing or cyanosis  Skin: Warm, dry      Lab Results: I have reviewed the following results:  Recent Labs     04/25/25  1816   WBC 14.61*   HGB 15.9   HCT 48.2      SODIUM 141   K 4.0      CO2 29   BUN 13   CREATININE 1.32*   GLUC 112   AST 59*   ALT 45   ALB 4.4   TBILI 1.45*   ALKPHOS 71   HSTNI0 3       Imaging Results Review: I personally reviewed the following image studies in PACS and associated  radiology reports: CT abdomen/pelvis. My interpretation of the radiology images/reports is: choledocholithiasis, cholecystitis.  Other Study Results Review: No additional pertinent studies reviewed.    VTE Pharmacologic Prophylaxis: VTE covered by:  heparin (porcine), Subcutaneous, 5,000 Units at 04/26/25 0001      VTE Mechanical Prophylaxis: sequential compression device    Jay Wilson MD  General Surgery   04/26/25

## 2025-04-26 NOTE — CONSULTS
Consultation - Gastroenterology   Name: Hebert Fischer 50 y.o. male I MRN: [id number]  Unit/Bed#: S -01 I Date of Admission: 4/25/2025   Date of Service: 4/26/2025 I Hospital Day: 1   Inpatient consult to gastroenterology  Consult performed by: Jessy Callahan PA-C  Consult ordered by: Jay Wilson MD        Physician Requesting Evaluation: Mildred Brambila DO   Reason for Evaluation / Principal Problem: Suspected choledocholithiasis    Assessment & Plan  Choledocholithiasis  Appears to have been transient, bilirubin trended down this morning and although initial CT scan was suspicious for calculus at the level of the ampulla, MRI appears to be negative in this regard, with no biliary ductal dilatation noted either.    -Previous imaging studies including CT scan and ultrasound were reviewed at this time    - Continue to monitor liver enzymes    - Timing and prospect of cholecystectomy as per general surgery, consider intraoperative cholangiogram    - He will also need outpatient colonoscopy for screening purposes, so we can contact our schedulers to get in touch with the patient to set this up nonurgently      History of Present Illness   HPI:  Hebert Fischer is a 50 y.o. male reporting no significant medical history who presented to the hospital yesterday with complaint of approximately 1 day of epigastric abdominal pain, he said it was severe and burning in nature, starting around 3 PM, about an hour after eating a sandwich with some ham and eggs.  He denied any vomiting or hematemesis, denies any change in bowel habits throughout this ordeal.    When he came to the hospital was noted with white count of 14, which is 10 today, he is being treated with IV antibiotics, he is afebrile.  His initial CT scan showed findings suspicious for ampullary stone, follow-up ultrasound did not demonstrate this although there were findings concerning for acute cholecystitis, he is being planned for  cholecystectomy.  These imaging studies were followed up with MRI which has been completed and does not appear to show evidence of a CBD stone, his CBD also appears to be normal caliber.    He has never had colonoscopy or EGD.  No known family history of colon cancer.      Review of Systems   Constitutional:  Negative for activity change, appetite change, chills, fatigue, fever and unexpected weight change.   HENT:  Negative for congestion, nosebleeds, rhinorrhea, sore throat and trouble swallowing.    Eyes:  Negative for pain, itching and visual disturbance.   Respiratory:  Negative for cough, shortness of breath and wheezing.    Cardiovascular:  Negative for chest pain, palpitations and leg swelling.   Gastrointestinal:  Negative for constipation, diarrhea, nausea and vomiting.   Endocrine: Negative for cold intolerance, heat intolerance and polyuria.   Genitourinary:  Negative for difficulty urinating, dysuria, flank pain, frequency and hematuria.   Musculoskeletal:  Negative for arthralgias, back pain, myalgias and neck pain.   Skin:  Negative for color change, pallor and rash.   Neurological:  Negative for dizziness, speech difficulty, weakness, numbness and headaches.   Hematological:  Does not bruise/bleed easily.   Psychiatric/Behavioral:  Negative for dysphoric mood and sleep disturbance. The patient is not nervous/anxious.      Historical Information   History reviewed. No pertinent past medical history.  History reviewed. No pertinent surgical history.  Social History     Tobacco Use    Smoking status: Former    Smokeless tobacco: Never   Vaping Use    Vaping status: Never Used   Substance and Sexual Activity    Alcohol use: No    Drug use: No    Sexual activity: Not on file     E-Cigarette/Vaping    E-Cigarette Use Never User      E-Cigarette/Vaping Substances       Social History     Tobacco Use    Smoking status: Former    Smokeless tobacco: Never   Vaping Use    Vaping status: Never Used   Substance  and Sexual Activity    Alcohol use: No    Drug use: No    Sexual activity: Not on file       Current Facility-Administered Medications:     acetaminophen (TYLENOL) tablet 975 mg, Q8H DOLORES    [START ON 4/27/2025] ceFAZolin (ANCEF) IVPB (premix in dextrose) 2,000 mg 50 mL, On Call To OR    ceftriaxone (ROCEPHIN) 1 g/50 mL in dextrose IVPB, Q24H, Last Rate: 1,000 mg (04/26/25 0049)    heparin (porcine) subcutaneous injection 5,000 Units, Q8H DOLORES    HYDROmorphone HCl (DILAUDID) injection 0.2 mg, Q4H PRN    lactated ringers infusion, Continuous, Last Rate: 125 mL/hr (04/26/25 1027)    metroNIDAZOLE (FLAGYL) IVPB (premix) 500 mg 100 mL, Q12H, Last Rate: 500 mg (04/26/25 1029)    ondansetron (ZOFRAN) injection 4 mg, Q6H PRN    oxyCODONE (ROXICODONE) immediate release tablet 10 mg, Q4H PRN    oxyCODONE (ROXICODONE) IR tablet 5 mg, Q4H PRN  Prior to Admission Medications   Prescriptions Last Dose Informant Patient Reported? Taking?   hydrOXYzine HCL (ATARAX) 25 mg tablet   No No   Sig: Take 1 tablet by mouth every 6 (six) hours   Patient not taking: Reported on 11/19/2020      Facility-Administered Medications: None     Patient has no known allergies.    Objective :  Temp:  [97.9 °F (36.6 °C)-98.7 °F (37.1 °C)] 98.1 °F (36.7 °C)  HR:  [46-97] 46  BP: (119-154)/(72-95) 119/72  Resp:  [18-20] 18  SpO2:  [97 %-100 %] 97 %  O2 Device: None (Room air)    Physical Exam  Constitutional:       General: He is not in acute distress.     Appearance: He is well-developed. He is not diaphoretic.   HENT:      Head: Normocephalic and atraumatic.   Eyes:      Conjunctiva/sclera: Conjunctivae normal.      Pupils: Pupils are equal, round, and reactive to light.   Cardiovascular:      Rate and Rhythm: Normal rate and regular rhythm.      Heart sounds: Normal heart sounds. No murmur heard.     No friction rub. No gallop.   Pulmonary:      Effort: Pulmonary effort is normal. No respiratory distress.      Breath sounds: Normal breath sounds. No  stridor. No wheezing or rales.   Abdominal:      General: Bowel sounds are normal. There is no distension.      Palpations: Abdomen is soft. There is no mass.      Tenderness: There is no abdominal tenderness. There is no guarding or rebound.   Musculoskeletal:         General: No tenderness. Normal range of motion.      Cervical back: Normal range of motion and neck supple.   Skin:     General: Skin is warm and dry.      Coloration: Skin is not pale.      Findings: No erythema or rash.   Neurological:      Mental Status: He is alert and oriented to person, place, and time.   Psychiatric:         Behavior: Behavior normal.           Lab Results: I have reviewed the following results:

## 2025-04-26 NOTE — ASSESSMENT & PLAN NOTE
Appears to have been transient, bilirubin trended down this morning and although initial CT scan was suspicious for calculus at the level of the ampulla, MRI appears to be negative in this regard, with no biliary ductal dilatation noted either.    -Previous imaging studies including CT scan and ultrasound were reviewed at this time    - Continue to monitor liver enzymes    - Timing and prospect of cholecystectomy as per general surgery, consider intraoperative cholangiogram    - He will also need outpatient colonoscopy for screening purposes, so we can contact our schedulers to get in touch with the patient to set this up nonurgently

## 2025-04-26 NOTE — ASSESSMENT & PLAN NOTE
- Admit to general surgery  - NPO MN  - LR @ 125  - Rocephin/Flagyl  - GI consulted. MRCP pending  - Lap Angelica this admission  - DVT ppx with SQH  - Trend T bili, WBC  - Encourage OOB, ambulation, IS

## 2025-04-26 NOTE — PLAN OF CARE
Problem: PAIN - ADULT  Goal: Verbalizes/displays adequate comfort level or baseline comfort level  Description: Interventions:- Encourage patient to monitor pain and request assistance- Assess pain using appropriate pain scale- Administer analgesics based on type and severity of pain and evaluate response- Implement non-pharmacological measures as appropriate and evaluate response- Consider cultural and social influences on pain and pain management- Notify physician/advanced practitioner if interventions unsuccessful or patient reports new pain  Outcome: Progressing     Problem: INFECTION - ADULT  Goal: Absence or prevention of progression during hospitalization  Description: INTERVENTIONS:- Assess and monitor for signs and symptoms of infection- Monitor lab/diagnostic results- Monitor all insertion sites, i.e. indwelling lines, tubes, and drains- Monitor endotracheal if appropriate and nasal secretions for changes in amount and color- Benson appropriate cooling/warming therapies per order- Administer medications as ordered- Instruct and encourage patient and family to use good hand hygiene technique- Identify and instruct in appropriate isolation precautions for identified infection/condition  Outcome: Progressing  Goal: Absence of fever/infection during neutropenic period  Description: INTERVENTIONS:- Monitor WBC  Outcome: Progressing

## 2025-04-26 NOTE — UTILIZATION REVIEW
Initial Clinical Review    Admission: Date/Time/Statement:   Admission Orders (From admission, onward)       Ordered        04/25/25 2243  Inpatient Admission  Once                          Orders Placed This Encounter   Procedures    Inpatient Admission     Standing Status:   Standing     Number of Occurrences:   1     Level of Care:   Med Surg [16]     Estimated length of stay:   More than 2 Midnights     Certification:   I certify that inpatient services are medically necessary for this patient for a duration of greater than two midnights. See H&P and MD Progress Notes for additional information about the patient's course of treatment.     ED Arrival Information       Expected   -    Arrival   4/25/2025 17:37    Acuity   Urgent              Means of arrival   Walk-In    Escorted by   Family Member    Service   Surgery-General    Admission type   Emergency              Arrival complaint   Food Poisoning             Chief Complaint   Patient presents with    Vomiting     Pts wife states pt ate a sandwich with eggs around 230pm and started vomiting after. C/o mid abd pain. Pt tearful, unable to sit still during triage.     Abdominal Pain       Initial Presentation: 50 y.o. male  to ED via walk in from home.    Admitted to inpatient with Dx: Choledocholithiasis.   Presented to ED with worsening abdominal pain and  nausea starting about 2.5 hours prior to arrival after eating egg salad for lunch.  Unable to tolerate po. PMHx: noncontributory. On exam:  acute distress. Appears ill.  Tachycardia.  Abdominal tenderness in RUQ and epigastric area. crying.  AST 59>343.  ALT 45>327.  Total bili 1.25.  wbc 4.61>10.31.    bun 13. Creatinine 1.32.   CT AP obtained with punctate (1 to 2 mm) calculus in the region of the ampulla. Trace intrahepatic biliary dilation. Normal CBD caliber. Mild pericholecystic fluid and trace gallbladder wall thickening.    ED treatment:  IVF bolus, Zofran, 4 doses of IV Analgesia.    Plan includes   to admit to surgery.   NPO at MN.  IVF. IV antibiotics.  Consult GI.  MRCP.  Trend total bili and  +WBC.     Date: 4/26/25   Day 2:  today with worsening LFTs.  MRCP revealing mildly distended gallbladder with mild gallbladder wall thickening with pericholecystic fluid suspicious for acute cholecystitis, but no biliary ductal dilation or choledocholithiasis.  On exam tender in epigastrium.   Continue IVF, pain control, IV antibiotics. Likely lap olimpia tomorrow.     Per GI 4/26/25:  differential includes possible choledocholithiasis with evolving biliary obstruction.  Continue supportive care,  trend LFTs. No ERCP at this time.   Likely Lap olimpia.      ED Treatment-Medication Administration from 04/25/2025 1736 to 04/25/2025 2309         Date/Time Order Dose Route Action     04/25/2025 1825 sodium chloride 0.9 % bolus 1,000 mL 1,000 mL Intravenous New Bag     04/25/2025 1813 ondansetron (ZOFRAN) injection 4 mg 4 mg Intravenous Given     04/25/2025 1813 HYDROmorphone (DILAUDID) injection 0.5 mg 0.5 mg Intravenous Given     04/25/2025 1826 acetaminophen (Ofirmev) injection 1,000 mg 1,000 mg Intravenous New Bag     04/25/2025 1826 aluminum-magnesium hydroxide-simethicone (MAALOX) oral suspension 30 mL 30 mL Oral Given     04/25/2025 1825 Famotidine (PF) (PEPCID) injection 20 mg 20 mg Intravenous Given     04/25/2025 2300 HYDROmorphone HCl (DILAUDID) injection 0.2 mg 0.2 mg Intravenous Given            Scheduled Medications:  acetaminophen, 975 mg, Oral, Q8H DOLORES  [START ON 4/27/2025] cefazolin, 2,000 mg, Intravenous, On Call To OR  cefTRIAXone, 1,000 mg, Intravenous, Q24H  heparin (porcine), 5,000 Units, Subcutaneous, Q8H DOLORES  metroNIDAZOLE, 500 mg, Intravenous, Q12H    Continuous IV Infusions:  lactated ringers, 125 mL/hr, Intravenous, Continuous      PRN Meds:  HYDROmorphone, 0.2 mg, Intravenous, Q4H PRN - x 1 at 2300 on 4/25/25  ondansetron, 4 mg, Intravenous, Q6H PRN  oxyCODONE, 10 mg, Oral, Q4H PRN x 2 thus far 4/26  at 0000 and 0826  oxyCODONE, 5 mg, Oral, Q4H PRN      ED Triage Vitals   Temperature Pulse Respirations Blood Pressure SpO2 Pain Score   04/25/25 1750 04/25/25 1749 04/25/25 1749 04/25/25 1749 04/25/25 1749 04/25/25 1749   98.7 °F (37.1 °C) 97 20 154/95 100 % 10 - Worst Possible Pain     Weight (last 2 days)       Date/Time Weight    04/25/25 2252 88 (194.01)          Vital Signs (last 3 days)       Date/Time Temp Pulse Resp BP MAP (mmHg) SpO2 O2 Device Patient Position - Orthostatic VS Pain    04/26/25 15:48:16 98.2 °F (36.8 °C) 50 18 123/70 88 96 % -- -- --    04/26/25 10:21:47 98.1 °F (36.7 °C) 46 18 119/72 88 97 % -- -- --    04/26/25 0826 -- -- -- -- -- -- -- -- 7    04/26/25 0705 -- -- -- -- -- -- None (Room air) -- 7    04/26/25 0623 -- -- -- -- -- -- -- -- 3    04/26/25 0000 -- -- -- -- -- -- -- -- No Pain    04/25/25 2255 97.9 °F (36.6 °C) 58 18 127/81 -- 99 % None (Room air) Lying --    04/25/25 1930 -- 63 20 124/81 96 97 % None (Room air) Lying --    04/25/25 1900 -- 64 20 132/88 106 98 % None (Room air) Lying --    04/25/25 1813 -- -- -- -- -- -- -- -- 10 - Worst Possible Pain    04/25/25 1750 98.7 °F (37.1 °C) -- -- -- -- -- -- -- --    04/25/25 1749 -- 97 20 154/95 -- 100 % None (Room air) Sitting 10 - Worst Possible Pain          Pertinent Labs/Diagnostic Test Results:   Radiology:  MRI abdomen wo contrast and mrcp   Final Interpretation by Soco Vasquez MD (04/26 1500)      Mildly distended gallbladder with mild gallbladder wall thickening with pericholecystic fluid, suspicious for acute cholecystitis particularly given the positive sonographic English sign on right upper quadrant ultrasound 4/25/2025.      No biliary ductal dilatation or choledocholithiasis.         Workstation performed: [id number]         XR follow up   Final Interpretation by Chaz Wright MD (04/26 5535)      No radiopaque orbital foreign body.         Workstation performed: [id number]         US right upper  quadrant   Final Interpretation by Edita Fortune MD (04/25 2246)      Gallbladder wall thickening and pericholecystic fluid with a positive sonographic English's sign suspicious for acute cholecystitis.      Punctate calculus in the region of the ampulla described on CT abdomen and pelvis performed earlier is not visualized on this study. GI consultation is recommended.      Workstation performed: [id number]         CT abdomen pelvis with contrast   Final Interpretation by Indio Loaiza MD (04/25 2124)      Punctate (1 to 2 mm) calculus in the region of the ampulla.   Trace intrahepatic biliary dilation. Normal CBD caliber.   Note of elevated bilirubin on today's labs.   GI consultation recommended.      Mild pericholecystic fluid and trace gallbladder wall thickening.   Clinical correlation advised for acute cholecystitis, right upper quadrant ultrasound could be considered.      Findings were discussed with Dr. Hayes from the ED at 9:20 p.m. on 4/25/2025            Workstation performed: [id number]           Cardiology:  ECG 12 lead    by Interface, Ris Results In (04/25 1811)        ECG 12 Lead Documentation Only   Date/Time: 4/25/2025 8:00 PM  Indications / Diagnosis:  Abdominal pain  Patient location:  ED  Interpretation:     Interpretation: normal    Rate:     ECG rate:  72    ECG rate assessment: normal    Rhythm:     Rhythm: sinus rhythm    Ectopy:     Ectopy: none    QRS:     QRS axis:  Normal  Conduction:     Conduction: normal    ST segments:     ST segments:  Normal  T waves:     T waves: normal    Other findings:     Other findings: delta wave    Comments:      Possible appearance of delta wave in leads V3 through V6, II, I.     GI:  No orders to display     Results from last 7 days   Lab Units 04/26/25  0454 04/25/25  1816   WBC Thousand/uL 10.31* 14.61*   HEMOGLOBIN g/dL 13.9 15.9   HEMATOCRIT % 42.6 48.2   PLATELETS Thousands/uL 281 354   TOTAL NEUT ABS Thousands/µL 5.75 10.50*      Results from last 7 days   Lab Units 04/26/25  0454 04/25/25  1816   SODIUM mmol/L 139 141   POTASSIUM mmol/L 4.0 4.0   CHLORIDE mmol/L 107 103   CO2 mmol/L 28 29   ANION GAP mmol/L 4 9   BUN mg/dL 12 13   CREATININE mg/dL 1.24 1.32*   EGFR ml/min/1.73sq m 67 62   CALCIUM mg/dL 8.3* 9.7   MAGNESIUM mg/dL 2.2  --      Results from last 7 days   Lab Units 04/26/25  0454 04/25/25  1816   AST U/L 343* 59*   ALT U/L 327* 45   ALK PHOS U/L 97 71   TOTAL PROTEIN g/dL 6.1* 7.9   ALBUMIN g/dL 3.7 4.4   TOTAL BILIRUBIN mg/dL 1.25* 1.45*     Results from last 7 days   Lab Units 04/26/25  0454 04/25/25  1816   GLUCOSE RANDOM mg/dL 82 112     Results from last 7 days   Lab Units 04/25/25  1816   HS TNI 0HR ng/L 3     Results from last 7 days   Lab Units 04/25/25  1816   LIPASE u/L 70     History reviewed. No pertinent past medical history.  Present on Admission:   Choledocholithiasis      Admitting Diagnosis: Choledocholithiasis [K80.50]  Choledocholithiasis with acute cholecystitis [K80.42]  Vomiting [R11.10]  Epigastric pain [R10.13]  Abdominal pain [R10.9]  Age/Sex: 50 y.o. male    Network Utilization Review Department  ATTENTION: Please call with any questions or concerns to [phone] and carefully listen to the prompts so that you are directed to the right person. All voicemails are confidential.   For Discharge needs, contact Care Management DC Support Team at [phone] opt. 2  Send all requests for admission clinical reviews, approved or denied determinations and any other requests to dedicated fax number below belonging to the campus where the patient is receiving treatment. List of dedicated fax numbers for the Facilities:  FACILITY NAME UR FAX NUMBER   ADMISSION DENIALS (Administrative/Medical Necessity) [phone]   DISCHARGE SUPPORT TEAM (NETWORK) [phone]   PARENT CHILD HEALTH (Maternity/NICU/Pediatrics) [phone]   [facility] [phone]   [facility] [phone]   [facility] [phone]   [facility] [phone]   [facility] [phone]   [facility] [phone]   [facility] [phone]   [facility] [phone]   [facility] [phone]   [facility] [phone]   [facility] [phone]   [facility] [phone]

## 2025-04-27 ENCOUNTER — ANESTHESIA EVENT (INPATIENT)
Dept: PERIOP | Facility: HOSPITAL | Age: 50
DRG: 419 | End: 2025-04-27

## 2025-04-27 ENCOUNTER — ANESTHESIA (INPATIENT)
Dept: PERIOP | Facility: HOSPITAL | Age: 50
DRG: 419 | End: 2025-04-27

## 2025-04-27 ENCOUNTER — APPOINTMENT (INPATIENT)
Dept: RADIOLOGY | Facility: HOSPITAL | Age: 50
DRG: 419 | End: 2025-04-27

## 2025-04-27 LAB
ALBUMIN SERPL BCG-MCNC: 3.5 G/DL (ref 3.5–5)
ALP SERPL-CCNC: 87 U/L (ref 34–104)
ALT SERPL W P-5'-P-CCNC: 193 U/L (ref 7–52)
ANION GAP SERPL CALCULATED.3IONS-SCNC: 6 MMOL/L (ref 4–13)
AST SERPL W P-5'-P-CCNC: 84 U/L (ref 13–39)
ATRIAL RATE: 72 BPM
BILIRUB SERPL-MCNC: 0.64 MG/DL (ref 0.2–1)
BUN SERPL-MCNC: 15 MG/DL (ref 5–25)
CALCIUM SERPL-MCNC: 8.4 MG/DL (ref 8.4–10.2)
CHLORIDE SERPL-SCNC: 108 MMOL/L (ref 96–108)
CO2 SERPL-SCNC: 25 MMOL/L (ref 21–32)
CREAT SERPL-MCNC: 1.24 MG/DL (ref 0.6–1.3)
ERYTHROCYTE [DISTWIDTH] IN BLOOD BY AUTOMATED COUNT: 12.7 % (ref 11.6–15.1)
GFR SERPL CREATININE-BSD FRML MDRD: 67 ML/MIN/1.73SQ M
GLUCOSE SERPL-MCNC: 87 MG/DL (ref 65–140)
HCT VFR BLD AUTO: 42.2 % (ref 36.5–49.3)
HGB BLD-MCNC: 13.4 G/DL (ref 12–17)
MAGNESIUM SERPL-MCNC: 2.1 MG/DL (ref 1.9–2.7)
MCH RBC QN AUTO: 30 PG (ref 26.8–34.3)
MCHC RBC AUTO-ENTMCNC: 31.8 G/DL (ref 31.4–37.4)
MCV RBC AUTO: 94 FL (ref 82–98)
P AXIS: -20 DEGREES
PHOSPHATE SERPL-MCNC: 3.7 MG/DL (ref 2.7–4.5)
PLATELET # BLD AUTO: 256 THOUSANDS/UL (ref 149–390)
PMV BLD AUTO: 10 FL (ref 8.9–12.7)
POTASSIUM SERPL-SCNC: 4.2 MMOL/L (ref 3.5–5.3)
PR INTERVAL: 120 MS
PROT SERPL-MCNC: 5.9 G/DL (ref 6.4–8.4)
QRS AXIS: 55 DEGREES
QRSD INTERVAL: 106 MS
QT INTERVAL: 370 MS
QTC INTERVAL: 405 MS
RBC # BLD AUTO: 4.47 MILLION/UL (ref 3.88–5.62)
SODIUM SERPL-SCNC: 139 MMOL/L (ref 135–147)
T WAVE AXIS: 47 DEGREES
VENTRICULAR RATE: 72 BPM
WBC # BLD AUTO: 8.99 THOUSAND/UL (ref 4.31–10.16)

## 2025-04-27 PROCEDURE — 88304 TISSUE EXAM BY PATHOLOGIST: CPT | Performed by: STUDENT IN AN ORGANIZED HEALTH CARE EDUCATION/TRAINING PROGRAM

## 2025-04-27 PROCEDURE — 93010 ELECTROCARDIOGRAM REPORT: CPT | Performed by: INTERNAL MEDICINE

## 2025-04-27 PROCEDURE — 83735 ASSAY OF MAGNESIUM: CPT

## 2025-04-27 PROCEDURE — 80053 COMPREHEN METABOLIC PANEL: CPT

## 2025-04-27 PROCEDURE — 85027 COMPLETE CBC AUTOMATED: CPT

## 2025-04-27 PROCEDURE — 84100 ASSAY OF PHOSPHORUS: CPT

## 2025-04-27 PROCEDURE — 0FT44ZZ RESECTION OF GALLBLADDER, PERCUTANEOUS ENDOSCOPIC APPROACH: ICD-10-PCS | Performed by: SURGERY

## 2025-04-27 RX ORDER — SODIUM CHLORIDE, SODIUM LACTATE, POTASSIUM CHLORIDE, CALCIUM CHLORIDE 600; 310; 30; 20 MG/100ML; MG/100ML; MG/100ML; MG/100ML
125 INJECTION, SOLUTION INTRAVENOUS CONTINUOUS
Status: DISCONTINUED | OUTPATIENT
Start: 2025-04-27 | End: 2025-04-27

## 2025-04-27 RX ORDER — SUCCINYLCHOLINE/SOD CL,ISO/PF 100 MG/5ML
SYRINGE (ML) INTRAVENOUS AS NEEDED
Status: DISCONTINUED | OUTPATIENT
Start: 2025-04-27 | End: 2025-04-27

## 2025-04-27 RX ORDER — DEXAMETHASONE SODIUM PHOSPHATE 10 MG/ML
INJECTION, SOLUTION INTRAMUSCULAR; INTRAVENOUS AS NEEDED
Status: DISCONTINUED | OUTPATIENT
Start: 2025-04-27 | End: 2025-04-27

## 2025-04-27 RX ORDER — HYDROMORPHONE HCL/PF 1 MG/ML
0.5 SYRINGE (ML) INJECTION
Status: DISCONTINUED | OUTPATIENT
Start: 2025-04-27 | End: 2025-04-27 | Stop reason: HOSPADM

## 2025-04-27 RX ORDER — SODIUM CHLORIDE 9 MG/ML
INJECTION, SOLUTION INTRAVENOUS AS NEEDED
Status: DISCONTINUED | OUTPATIENT
Start: 2025-04-27 | End: 2025-04-27 | Stop reason: HOSPADM

## 2025-04-27 RX ORDER — FENTANYL CITRATE 50 UG/ML
INJECTION, SOLUTION INTRAMUSCULAR; INTRAVENOUS AS NEEDED
Status: DISCONTINUED | OUTPATIENT
Start: 2025-04-27 | End: 2025-04-27

## 2025-04-27 RX ORDER — MIDAZOLAM HYDROCHLORIDE 2 MG/2ML
INJECTION, SOLUTION INTRAMUSCULAR; INTRAVENOUS AS NEEDED
Status: DISCONTINUED | OUTPATIENT
Start: 2025-04-27 | End: 2025-04-27

## 2025-04-27 RX ORDER — PROPOFOL 10 MG/ML
INJECTION, EMULSION INTRAVENOUS AS NEEDED
Status: DISCONTINUED | OUTPATIENT
Start: 2025-04-27 | End: 2025-04-27

## 2025-04-27 RX ORDER — ONDANSETRON 2 MG/ML
4 INJECTION INTRAMUSCULAR; INTRAVENOUS ONCE AS NEEDED
Status: DISCONTINUED | OUTPATIENT
Start: 2025-04-27 | End: 2025-04-27 | Stop reason: HOSPADM

## 2025-04-27 RX ORDER — FENTANYL CITRATE/PF 50 MCG/ML
50 SYRINGE (ML) INJECTION
Status: COMPLETED | OUTPATIENT
Start: 2025-04-27 | End: 2025-04-27

## 2025-04-27 RX ORDER — CEFEPIME HYDROCHLORIDE 2 G/50ML
INJECTION, SOLUTION INTRAVENOUS AS NEEDED
Status: DISCONTINUED | OUTPATIENT
Start: 2025-04-27 | End: 2025-04-27

## 2025-04-27 RX ORDER — LIDOCAINE HCL/PF 100 MG/5ML
SYRINGE (ML) INJECTION AS NEEDED
Status: DISCONTINUED | OUTPATIENT
Start: 2025-04-27 | End: 2025-04-27

## 2025-04-27 RX ORDER — HYDROMORPHONE HCL/PF 1 MG/ML
SYRINGE (ML) INJECTION AS NEEDED
Status: DISCONTINUED | OUTPATIENT
Start: 2025-04-27 | End: 2025-04-27

## 2025-04-27 RX ORDER — ROCURONIUM BROMIDE 10 MG/ML
INJECTION, SOLUTION INTRAVENOUS AS NEEDED
Status: DISCONTINUED | OUTPATIENT
Start: 2025-04-27 | End: 2025-04-27

## 2025-04-27 RX ORDER — ONDANSETRON 2 MG/ML
INJECTION INTRAMUSCULAR; INTRAVENOUS AS NEEDED
Status: DISCONTINUED | OUTPATIENT
Start: 2025-04-27 | End: 2025-04-27

## 2025-04-27 RX ADMIN — ROCURONIUM 10 MG: 50 INJECTION, SOLUTION INTRAVENOUS at 10:19

## 2025-04-27 RX ADMIN — HYDROMORPHONE HYDROCHLORIDE 0.5 MG: 1 INJECTION, SOLUTION INTRAMUSCULAR; INTRAVENOUS; SUBCUTANEOUS at 12:24

## 2025-04-27 RX ADMIN — PROPOFOL 160 MG: 10 INJECTION, EMULSION INTRAVENOUS at 10:19

## 2025-04-27 RX ADMIN — FENTANYL CITRATE 50 MCG: 50 INJECTION INTRAMUSCULAR; INTRAVENOUS at 12:04

## 2025-04-27 RX ADMIN — FENTANYL CITRATE 25 MCG: 50 INJECTION INTRAMUSCULAR; INTRAVENOUS at 11:36

## 2025-04-27 RX ADMIN — SUGAMMADEX 200 MG: 100 INJECTION, SOLUTION INTRAVENOUS at 11:37

## 2025-04-27 RX ADMIN — HYDROMORPHONE HYDROCHLORIDE 0.2 MG: 0.2 INJECTION, SOLUTION INTRAMUSCULAR; INTRAVENOUS; SUBCUTANEOUS at 00:13

## 2025-04-27 RX ADMIN — LIDOCAINE HYDROCHLORIDE 80 MG: 20 INJECTION INTRAVENOUS at 10:19

## 2025-04-27 RX ADMIN — FENTANYL CITRATE 25 MCG: 50 INJECTION INTRAMUSCULAR; INTRAVENOUS at 11:43

## 2025-04-27 RX ADMIN — CEFEPIME HYDROCHLORIDE 2000 MG: 2 INJECTION, SOLUTION INTRAVENOUS at 10:22

## 2025-04-27 RX ADMIN — HEPARIN SODIUM 5000 UNITS: 5000 INJECTION INTRAVENOUS; SUBCUTANEOUS at 21:31

## 2025-04-27 RX ADMIN — ACETAMINOPHEN 975 MG: 325 TABLET, FILM COATED ORAL at 14:30

## 2025-04-27 RX ADMIN — DEXAMETHASONE SODIUM PHOSPHATE 10 MG: 10 INJECTION INTRAMUSCULAR; INTRAVENOUS at 10:21

## 2025-04-27 RX ADMIN — FENTANYL CITRATE 50 MCG: 50 INJECTION INTRAMUSCULAR; INTRAVENOUS at 10:19

## 2025-04-27 RX ADMIN — ACETAMINOPHEN 975 MG: 325 TABLET, FILM COATED ORAL at 21:30

## 2025-04-27 RX ADMIN — CEFTRIAXONE SODIUM 1000 MG: 10 INJECTION, POWDER, FOR SOLUTION INTRAVENOUS at 00:13

## 2025-04-27 RX ADMIN — ROCURONIUM 40 MG: 50 INJECTION, SOLUTION INTRAVENOUS at 10:30

## 2025-04-27 RX ADMIN — Medication 100 MG: at 10:19

## 2025-04-27 RX ADMIN — HEPARIN SODIUM 5000 UNITS: 5000 INJECTION INTRAVENOUS; SUBCUTANEOUS at 06:43

## 2025-04-27 RX ADMIN — MIDAZOLAM 2 MG: 1 INJECTION INTRAMUSCULAR; INTRAVENOUS at 10:13

## 2025-04-27 RX ADMIN — OXYCODONE HYDROCHLORIDE 5 MG: 5 TABLET ORAL at 14:31

## 2025-04-27 RX ADMIN — HYDROMORPHONE HYDROCHLORIDE 1 MG: 1 INJECTION, SOLUTION INTRAMUSCULAR; INTRAVENOUS; SUBCUTANEOUS at 10:35

## 2025-04-27 RX ADMIN — FENTANYL CITRATE 50 MCG: 50 INJECTION INTRAMUSCULAR; INTRAVENOUS at 12:19

## 2025-04-27 RX ADMIN — OXYCODONE HYDROCHLORIDE 5 MG: 5 TABLET ORAL at 21:30

## 2025-04-27 RX ADMIN — HYDROMORPHONE HYDROCHLORIDE 0.2 MG: 0.2 INJECTION, SOLUTION INTRAMUSCULAR; INTRAVENOUS; SUBCUTANEOUS at 18:50

## 2025-04-27 RX ADMIN — HEPARIN SODIUM 5000 UNITS: 5000 INJECTION INTRAVENOUS; SUBCUTANEOUS at 14:31

## 2025-04-27 RX ADMIN — ACETAMINOPHEN 975 MG: 325 TABLET, FILM COATED ORAL at 06:43

## 2025-04-27 RX ADMIN — ONDANSETRON 4 MG: 2 INJECTION INTRAMUSCULAR; INTRAVENOUS at 11:37

## 2025-04-27 NOTE — PROGRESS NOTES
Progress Note - Surgery-General   Name: Hebert Fischer 50 y.o. male I MRN: [id number]  Unit/Bed#: S -01 I Date of Admission: 4/25/2025   Date of Service: 4/27/2025 I Hospital Day: 2     Assessment & Plan  Choledocholithiasis  50-year-old male with concern for choledocholithiasis    4/26 MRCP: Mildly distended gallbladder with mild gallbladder wall thickening with pericholecystic fluid, suspicious for acute cholecystitis, no biliary ductal dilatation or choledocholithiasis.    Afebrile, vital signs stable within normal limits on room air    WBC 9 from 10.3  Hemoglobin 13.4 from 13.9  Creatinine 1.2 from 1.2  AST 84 from 343   from 330  T. bili 0.64 from 1.25    Plan  - N.p.o.  - OR today for laparoscopic cholecystectomy with possible IOC  - IV fluids  - IV antibiotics  - Encourage ambulation/out of bed, 3 times daily  - Encourage incentive spirometer use, 10 times per hour  - Multimodal pain regimen      Subjective   Patient seen and examined at bedside.  Patient denies abdominal pain.  Patient tolerated diet yesterday.  Patient denies fevers chills and shortness of breath at this time.  Patient aware of plan for OR today.    Objective :  Temp:  [97.7 °F (36.5 °C)-98.3 °F (36.8 °C)] 97.7 °F (36.5 °C)  HR:  [46-66] 66  BP: (119-156)/(70-89) 156/89  Resp:  [16-18] 16  SpO2:  [96 %-97 %] 97 %  O2 Device: None (Room air)    I/O         04/25 0701  04/26 0700 04/26 0701  04/27 0700    P.O.  0    IV Piggyback 1100     Total Intake(mL/kg) 1100 (12.5) 0 (0)    Net +1100 0          Unmeasured Urine Occurrence  1 x            Physical Exam  Vitals and nursing note reviewed.   Constitutional:       General: He is not in acute distress.     Appearance: Normal appearance. He is normal weight. He is not ill-appearing or toxic-appearing.   HENT:      Head: Normocephalic and atraumatic.   Eyes:      General: No scleral icterus.  Cardiovascular:      Rate and Rhythm: Normal rate.   Pulmonary:      Effort: Pulmonary effort  is normal.   Abdominal:      General: Abdomen is flat. There is no distension.      Palpations: Abdomen is soft.      Tenderness: There is no abdominal tenderness. There is no guarding.   Musculoskeletal:      Right lower leg: No edema.      Left lower leg: No edema.   Skin:     General: Skin is warm.   Neurological:      Mental Status: He is alert and oriented to person, place, and time.   Psychiatric:         Mood and Affect: Mood normal.         Lab Results: I have reviewed the following results:  Recent Labs     04/25/25  1816 04/26/25  0454 04/27/25  0514   WBC 14.61*   < > 8.99   HGB 15.9   < > 13.4   HCT 48.2   < > 42.2      < > 256   SODIUM 141   < > 139   K 4.0   < > 4.2      < > 108   CO2 29   < > 25   BUN 13   < > 15   CREATININE 1.32*   < > 1.24   GLUC 112   < > 87   MG  --    < > 2.1   PHOS  --   --  3.7   AST 59*   < > 84*   ALT 45   < > 193*   ALB 4.4   < > 3.5   TBILI 1.45*   < > 0.64   ALKPHOS 71   < > 87   HSTNI0 3  --   --     < > = values in this interval not displayed.       Imaging Results Review: No pertinent imaging studies reviewed.  Other Study Results Review: No additional pertinent studies reviewed.    VTE Pharmacologic Prophylaxis: VTE covered by:  heparin (porcine), Subcutaneous, 5,000 Units at 04/26/25 1309     VTE Mechanical Prophylaxis: sequential compression device

## 2025-04-27 NOTE — ANESTHESIA POSTPROCEDURE EVALUATION
Post-Op Assessment Note    CV Status:  Stable  Pain Score: 0    Pain management: adequate       Mental Status:  Awake and alert   Hydration Status:  Stable and euvolemic   PONV Controlled:  None   Airway Patency:  Patent and adequate     Post Op Vitals Reviewed: Yes    No anethesia notable event occurred.    Staff: CRNA           Last Filed PACU Vitals:  Vitals Value Taken Time   Temp  97.3F    Pulse 58    /97    Resp 22    SpO2 100%

## 2025-04-27 NOTE — PLAN OF CARE
Problem: PAIN - ADULT  Goal: Verbalizes/displays adequate comfort level or baseline comfort level  Description: Interventions:- Encourage patient to monitor pain and request assistance- Assess pain using appropriate pain scale- Administer analgesics based on type and severity of pain and evaluate response- Implement non-pharmacological measures as appropriate and evaluate response- Consider cultural and social influences on pain and pain management- Notify physician/advanced practitioner if interventions unsuccessful or patient reports new pain  Outcome: Progressing     Problem: INFECTION - ADULT  Goal: Absence or prevention of progression during hospitalization  Description: INTERVENTIONS:- Assess and monitor for signs and symptoms of infection- Monitor lab/diagnostic results- Monitor all insertion sites, i.e. indwelling lines, tubes, and drains- Monitor endotracheal if appropriate and nasal secretions for changes in amount and color- [place] appropriate cooling/warming therapies per order- Administer medications as ordered- Instruct and encourage patient and family to use good hand hygiene technique- Identify and instruct in appropriate isolation precautions for identified infection/condition  Outcome: Progressing  Goal: Absence of fever/infection during neutropenic period  Description: INTERVENTIONS:- Monitor WBC  Outcome: Progressing

## 2025-04-27 NOTE — ANESTHESIA PREPROCEDURE EVALUATION
Procedure:  CHOLECYSTECTOMY LAPAROSCOPIC W/ INTRAOP CHOLANGIOGRAM (Abdomen)    Relevant Problems   No relevant active problems        Physical Exam    Airway    Mallampati score: II  TM Distance: >3 FB  Neck ROM: full     Dental    upper dentures,     Cardiovascular  Rhythm: regular, Rate: normal, Cardiovascular exam normal    Pulmonary  Pulmonary exam normal Breath sounds clear to auscultation    Other Findings        Anesthesia Plan  ASA Score- 2     Anesthesia Type- general with ASA Monitors.         Additional Monitors:     Airway Plan: ETT.    Comment: Risks/benefits and alternatives discussed including medication reaction, sore throat, aspiration, dental/oropharyngeal/ocular injuries, and/or grave/life threatening anesthetic and surgical emergencies..       Plan Factors-Exercise tolerance (METS): >4 METS.    Chart reviewed.    Patient summary reviewed.      Patient instructed to abstain from smoking on day of procedure. Patient did not smoke on day of surgery.            Induction- intravenous.    Postoperative Plan- Plan for postoperative opioid use. Planned trial extubation    Perioperative Resuscitation Plan - Level 1 - Full Code.       Informed Consent- Anesthetic plan and risks discussed with patient.  I personally reviewed this patient with the CRNA. Discussed and agreed on the Anesthesia Plan with the CRNA..      NPO Status:  Vitals Value Taken Time   Date of last liquid 04/27/25 04/27/25 0523   Time of last liquid 0000 04/27/25 0523   Date of last solid 04/26/25 04/27/25 0523   Time of last solid 1800 04/27/25 0523

## 2025-04-27 NOTE — OP NOTE
OPERATIVE REPORT  PATIENT NAME: Hebert Fischer    :  1975  MRN: [id number]  Pt Location: AN OR ROOM 04    SURGERY DATE: 2025    Surgeons and Role:     * Mildred Brambila DO - Primary     * Fabiano Bentley DO - Assisting     * Jay Wilson MD    Preop Diagnosis:  Choledocholithiasis with acute cholecystitis [K80.42]    Post-Op Diagnosis Codes:     * Choledocholithiasis with acute cholecystitis [K80.42]    Procedure(s):  CHOLECYSTECTOMY LAPAROSCOPIC    Specimen(s):  ID Type Source Tests Collected by Time Destination   1 : Gallbladder Tissue Gallbladder TISSUE EXAM Mildred Brambila DO 2025 1148        Estimated Blood Loss:   Minimal    Drains:  * No LDAs found *    Anesthesia Type:   General    Operative Indications:  Choledocholithiasis with acute cholecystitis [K80.42]    Operative Findings:  Acute cholecystitis      Complications:   None    Procedure and Technique:  The patient was seen again in the Holding Room. The risks, benefits, complications, treatment options, and expected outcomes were discussed with the patient. The possibilities of reaction to medication, pulmonary aspiration, perforation of viscus, bleeding, recurrent infection, finding a normal gallbladder, the need for additional procedures, failure to diagnose a condition, the possible need to convert to an open procedure, and creating a complication requiring transfusion or operation were discussed with the patient. The patient concurred with the proposed plan, giving informed consent. The site of surgery properly noted/marked. The patient was taken to Operating Room, identified as Hebert Fischer  and the procedure verified as Laparoscopic Cholecystectomy. A Time Out was held after prepping and draping in sterile fashion.  The above information was confirmed.    Prior to the induction of general anesthesia, antibiotic prophylaxis was administered. General endotracheal anesthesia was then administered and tolerated well.  After the induction, the abdomen was prepped in the usual sterile fashion. The patient was positioned in the supine position.    Local anesthetic agent was injected into the skin near the umbilicus and an incision made. The midline fascia was incised and the open technique was used to introduce a  port under direct vision.  Pneumoperitoneum was then created with CO2 and was tolerated well without any adverse changes in the patient's vital signs. Additional trocars were introduced under direct vision. All skin incisions were infiltrated with a local anesthetic agent before making the incision and placing the trocars.  The patient was placed in reverse Trendelenburg position.    The gallbladder was identified, the fundus grasped and retracted cephalad. Adhesions were lysed bluntly and with the electrocautery where indicated, taking care not to injure any adjacent organs or viscus. The infundibulum was grasped and retracted laterally, exposing the peritoneum overlying the triangle of Calot. This was then dissected anteriorily and posteriorly and exposed in a blunt fashion or using cautery where appropriate. The right hepatic artery was noted to be in close proximity to the cystic triangle. The cystic duct was clearly identified and  dissected circumferentially, as was the cystic artery, as the only two tubular structures leading into the gallbladder .  The critical view of the [place] of [place] was identified.  The posterior aspect of the gallbladder was lifted off the cystic plate, to insure that there were no posterior structres behind the gallbladder.      Once this was all clearly identified, the cystic duct was then doubly ligated with surgical clips on the patient side and singly clipped on the gallbladder side and divided. The cystic artery was re-identified,  ligated with clips and divided as well.     The gallbladder was dissected from the liver bed in retrograde fashion with the electrocautery. The  gallbladder was placed into an endocatch bag and secured.  The liver bed was irrigated and inspected. Hemostasis was achieved with the electrocautery. Copious irrigation was utilized and was repeatedly aspirated until clear.    The camera was then switched to the lateral port and directed back to the midline. The specimen was removed under direct visualization from the midline incision. Pneumoperitoneum was completely reduced after viewing removal of the trocars under direct vision. The fascia was then closed using the a figure of eight 0 vicryl suture. The skin was then closed with 4-0 monocryl and skin glue.    Instrument, sponge, and needle counts were correct at closure and at the conclusion of the case.       Patient Disposition:  PACU  and extubated and stable        SIGNATURE: Jay Wilson MD  DATE: April 27, 2025  TIME: 12:17 PM

## 2025-04-27 NOTE — ASSESSMENT & PLAN NOTE
50-year-old male with concern for choledocholithiasis    4/26 MRCP: Mildly distended gallbladder with mild gallbladder wall thickening with pericholecystic fluid, suspicious for acute cholecystitis, no biliary ductal dilatation or choledocholithiasis.    Afebrile, vital signs stable within normal limits on room air    WBC 9 from 10.3  Hemoglobin 13.4 from 13.9  Creatinine 1.2 from 1.2  AST 84 from 343   from 330  T. bili 0.64 from 1.25    Plan  - N.p.o.  - OR today for laparoscopic cholecystectomy with possible IOC  - IV fluids  - IV antibiotics  - Encourage ambulation/out of bed, 3 times daily  - Encourage incentive spirometer use, 10 times per hour  - Multimodal pain regimen

## 2025-04-28 VITALS
SYSTOLIC BLOOD PRESSURE: 147 MMHG | TEMPERATURE: 98.6 F | BODY MASS INDEX: 27.77 KG/M2 | OXYGEN SATURATION: 95 % | HEART RATE: 82 BPM | HEIGHT: 70 IN | RESPIRATION RATE: 18 BRPM | WEIGHT: 194 LBS | DIASTOLIC BLOOD PRESSURE: 89 MMHG

## 2025-04-28 LAB
ALBUMIN SERPL BCG-MCNC: 4.2 G/DL (ref 3.5–5)
ALP SERPL-CCNC: 96 U/L (ref 34–104)
ALT SERPL W P-5'-P-CCNC: 186 U/L (ref 7–52)
ANION GAP SERPL CALCULATED.3IONS-SCNC: 7 MMOL/L (ref 4–13)
AST SERPL W P-5'-P-CCNC: 71 U/L (ref 13–39)
BASOPHILS # BLD AUTO: 0.02 THOUSANDS/ÂΜL (ref 0–0.1)
BASOPHILS NFR BLD AUTO: 0 % (ref 0–1)
BILIRUB SERPL-MCNC: 0.89 MG/DL (ref 0.2–1)
BUN SERPL-MCNC: 10 MG/DL (ref 5–25)
CALCIUM SERPL-MCNC: 9.3 MG/DL (ref 8.4–10.2)
CHLORIDE SERPL-SCNC: 103 MMOL/L (ref 96–108)
CO2 SERPL-SCNC: 30 MMOL/L (ref 21–32)
CREAT SERPL-MCNC: 1.25 MG/DL (ref 0.6–1.3)
EOSINOPHIL # BLD AUTO: 0.01 THOUSAND/ÂΜL (ref 0–0.61)
EOSINOPHIL NFR BLD AUTO: 0 % (ref 0–6)
ERYTHROCYTE [DISTWIDTH] IN BLOOD BY AUTOMATED COUNT: 12.9 % (ref 11.6–15.1)
GFR SERPL CREATININE-BSD FRML MDRD: 67 ML/MIN/1.73SQ M
GLUCOSE SERPL-MCNC: 116 MG/DL (ref 65–140)
HCT VFR BLD AUTO: 41.7 % (ref 36.5–49.3)
HGB BLD-MCNC: 13.9 G/DL (ref 12–17)
IMM GRANULOCYTES # BLD AUTO: 0.07 THOUSAND/UL (ref 0–0.2)
IMM GRANULOCYTES NFR BLD AUTO: 0 % (ref 0–2)
LYMPHOCYTES # BLD AUTO: 2.41 THOUSANDS/ÂΜL (ref 0.6–4.47)
LYMPHOCYTES NFR BLD AUTO: 14 % (ref 14–44)
MCH RBC QN AUTO: 30.6 PG (ref 26.8–34.3)
MCHC RBC AUTO-ENTMCNC: 33.3 G/DL (ref 31.4–37.4)
MCV RBC AUTO: 92 FL (ref 82–98)
MONOCYTES # BLD AUTO: 1.19 THOUSAND/ÂΜL (ref 0.17–1.22)
MONOCYTES NFR BLD AUTO: 7 % (ref 4–12)
NEUTROPHILS # BLD AUTO: 13.19 THOUSANDS/ÂΜL (ref 1.85–7.62)
NEUTS SEG NFR BLD AUTO: 79 % (ref 43–75)
NRBC BLD AUTO-RTO: 0 /100 WBCS
PLATELET # BLD AUTO: 299 THOUSANDS/UL (ref 149–390)
PMV BLD AUTO: 10.4 FL (ref 8.9–12.7)
POTASSIUM SERPL-SCNC: 4.8 MMOL/L (ref 3.5–5.3)
PROT SERPL-MCNC: 7.2 G/DL (ref 6.4–8.4)
RBC # BLD AUTO: 4.54 MILLION/UL (ref 3.88–5.62)
SODIUM SERPL-SCNC: 140 MMOL/L (ref 135–147)
WBC # BLD AUTO: 16.89 THOUSAND/UL (ref 4.31–10.16)

## 2025-04-28 PROCEDURE — NC001 PR NO CHARGE: Performed by: SURGERY

## 2025-04-28 PROCEDURE — 80053 COMPREHEN METABOLIC PANEL: CPT

## 2025-04-28 PROCEDURE — 85025 COMPLETE CBC W/AUTO DIFF WBC: CPT

## 2025-04-28 RX ORDER — ACETAMINOPHEN 325 MG/1
975 TABLET ORAL EVERY 8 HOURS SCHEDULED
COMMUNITY
Start: 2025-04-28 | End: 2025-05-05

## 2025-04-28 RX ORDER — OXYCODONE HYDROCHLORIDE 5 MG/1
5 TABLET ORAL EVERY 6 HOURS PRN
Qty: 12 TABLET | Refills: 0 | Status: SHIPPED | OUTPATIENT
Start: 2025-04-28 | End: 2025-05-08

## 2025-04-28 RX ADMIN — HYDROMORPHONE HYDROCHLORIDE 0.2 MG: 0.2 INJECTION, SOLUTION INTRAMUSCULAR; INTRAVENOUS; SUBCUTANEOUS at 09:18

## 2025-04-28 RX ADMIN — ACETAMINOPHEN 975 MG: 325 TABLET, FILM COATED ORAL at 05:39

## 2025-04-28 RX ADMIN — OXYCODONE HYDROCHLORIDE 5 MG: 5 TABLET ORAL at 07:27

## 2025-04-28 RX ADMIN — HEPARIN SODIUM 5000 UNITS: 5000 INJECTION INTRAVENOUS; SUBCUTANEOUS at 05:39

## 2025-04-28 NOTE — DISCHARGE SUMMARY
Discharge Summary - Surgery-General   Name: Hebert Fischer 50 y.o. male I MRN: [id number]  Unit/Bed#: S -01 I Date of Admission: 4/25/2025   Date of Service: 4/28/2025 I Hospital Day: 3    Assessment & Plan  Choledocholithiasis  50-year-old male with concern for choledocholithiasis    4/26 MRCP: Mildly distended gallbladder with mild gallbladder wall thickening with pericholecystic fluid, suspicious for acute cholecystitis, no biliary ductal dilatation or choledocholithiasis.    1 Day Post-Op s/p lap olimpia    Plan  - Diet as tolerated  - Encourage ambulation/out of bed, 3 times daily  - Encourage incentive spirometer use, 10 times per hour  - Multimodal pain regimen  - DVT ppx  - Discharge home today    Admission Date: 4/25/2025 1753  Discharge Date: 04/28/25  Admitting Diagnosis: Choledocholithiasis [K80.50]  Choledocholithiasis with acute cholecystitis [K80.42]  Vomiting [R11.10]  Epigastric pain [R10.13]  Abdominal pain [R10.9]  Discharge Diagnosis:   Medical Problems       Resolved Problems  Date Reviewed: 10/29/2016   None         HPI: Hebert Fischer is a 50 y.o. male who presents with one day of abdominal pain. The pain is located in his epigastrium. It does not radiate. No alleviating or aggravating factors. No prior similar episodes. PSH includes right inguinal hernia repair. Not on AC/AP medication. No recent EGD. Denies noticing jaundice, pale stool, or dark colored urine. Labs notable for leukocytosis and T Bilie of 1.4 CT AP obtained with punctate (1 to 2 mm) calculus in the region of the ampulla. Trace intrahepatic biliary dilation. Normal CBD caliber. Mild pericholecystic fluid and trace gallbladder wall thickening. On exam, abdomen is soft, tender in the epigastrium, no rebound or guarding.     Procedures Performed:   4/28 Laparoscopic Cholecystectomy    Summary of Hospital Course:  Hebert Fischer  presented for the above listed diagnosis. GI was consulted and an MRCP was performed which showed no  remaining choledocholithiasis. His bilirubin normalized and he then underwent laparoscopic cholecystectomy. he  tolerated the surgery well with no immediate post operative complications. See operative note for more details. The remainder of his  post operative course was uncomplicated. By POD 1 Hebert Fischer was tolerating a diet, ambulating near baseline, voiding spontaneously, and his  pain was well controlled on oral analgesics. he was evaluated on rounds on 04/28/25 and determined to be stable for discharge home with outpatient follow up.      Significant Findings, Care, Treatment and Services Provided:   MRI abdomen wo contrast and mrcp  Result Date: 4/26/2025  Impression: Mildly distended gallbladder with mild gallbladder wall thickening with pericholecystic fluid, suspicious for acute cholecystitis particularly given the positive sonographic English sign on right upper quadrant ultrasound 4/25/2025. No biliary ductal dilatation or choledocholithiasis. Workstation performed: [id number]     XR follow up  Result Date: 4/26/2025  Impression: No radiopaque orbital foreign body. Workstation performed: [id number]     US right upper quadrant  Result Date: 4/25/2025  Impression: Gallbladder wall thickening and pericholecystic fluid with a positive sonographic English's sign suspicious for acute cholecystitis. Punctate calculus in the region of the ampulla described on CT abdomen and pelvis performed earlier is not visualized on this study. GI consultation is recommended. Workstation performed: [id number]     CT abdomen pelvis with contrast  Result Date: 4/25/2025  Impression: Punctate (1 to 2 mm) calculus in the region of the ampulla. Trace intrahepatic biliary dilation. Normal CBD caliber. Note of elevated bilirubin on today's labs. GI consultation recommended. Mild pericholecystic fluid and trace gallbladder wall thickening. Clinical correlation advised for acute cholecystitis, right upper quadrant ultrasound could be  "considered. Findings were discussed with Dr. Hayes from the ED at 9:20 p.m. on 4/25/2025 Workstation performed: [id number]         Complications: None    Discharge Day Visit / Exam:   /89 (BP Location: Left arm)   Pulse 82   Temp 98.6 °F (37 °C) (Oral)   Resp 18   Ht 5' 10\" (1.778 m)   Wt 88 kg (194 lb 0.1 oz)   SpO2 95%   BMI 27.84 kg/m²     Physical Exam:  General: No acute distress, alert and oriented  CV: Well perfused, regular rate  Lungs: Normal work of breathing, no increased respiratory effort  Abdomen: Soft, appropriately-tender, non-distended. Incision(s) clean, dry and intact.  Extremities: No edema, clubbing or cyanosis  Skin: Warm, dry      Condition at Discharge: stable       Discharge instructions/Information to patient and family:   See After Visit Summary (AVS) for information provided to patient and family.      Provisions for Follow-Up Care:  See after visit summary for information related to follow-up care and any pertinent home health orders.      PCP: Shaina Naik MD    Disposition: See After Visit Summary for discharge disposition information.    Planned Readmission: No     Discharge Medications:  See after visit summary for reconciled discharge medications provided to patient and family.      Discharge Statement:  I have spent a total time of 15 minutes in caring for this patient on the day of the visit/encounter.  "

## 2025-04-28 NOTE — ASSESSMENT & PLAN NOTE
50-year-old male with concern for choledocholithiasis    4/26 MRCP: Mildly distended gallbladder with mild gallbladder wall thickening with pericholecystic fluid, suspicious for acute cholecystitis, no biliary ductal dilatation or choledocholithiasis.    1 Day Post-Op s/p lap olimpia    Plan  - Diet as tolerated  - Encourage ambulation/out of bed, 3 times daily  - Encourage incentive spirometer use, 10 times per hour  - Multimodal pain regimen  - DVT ppx  - Discharge home today

## 2025-04-28 NOTE — PLAN OF CARE
Problem: PAIN - ADULT  Goal: Verbalizes/displays adequate comfort level or baseline comfort level  Description: Interventions:- Encourage patient to monitor pain and request assistance- Assess pain using appropriate pain scale- Administer analgesics based on type and severity of pain and evaluate response- Implement non-pharmacological measures as appropriate and evaluate response- Consider cultural and social influences on pain and pain management- Notify physician/advanced practitioner if interventions unsuccessful or patient reports new pain  Outcome: Progressing     Problem: INFECTION - ADULT  Goal: Absence or prevention of progression during hospitalization  Description: INTERVENTIONS:- Assess and monitor for signs and symptoms of infection- Monitor lab/diagnostic results- Monitor all insertion sites, i.e. indwelling lines, tubes, and drains- Monitor endotracheal if appropriate and nasal secretions for changes in amount and color- [place] appropriate cooling/warming therapies per order- Administer medications as ordered- Instruct and encourage patient and family to use good hand hygiene technique- Identify and instruct in appropriate isolation precautions for identified infection/condition  Outcome: Progressing  Goal: Absence of fever/infection during neutropenic period  Description: INTERVENTIONS:- Monitor WBC  Outcome: Progressing

## 2025-04-30 PROCEDURE — 88304 TISSUE EXAM BY PATHOLOGIST: CPT | Performed by: STUDENT IN AN ORGANIZED HEALTH CARE EDUCATION/TRAINING PROGRAM

## 2025-04-30 NOTE — ANESTHESIA POSTPROCEDURE EVALUATION
Post-Op Assessment Note    CV Status:  Stable  Pain Score: 0    Pain management: adequate       Mental Status:  Awake and sleepy   Hydration Status:  Stable   PONV Controlled:  None   Airway Patency:  Patent     Post Op Vitals Reviewed: Yes    No anethesia notable event occurred.    Staff: Anesthesiologist           Last Filed PACU Vitals:  Vitals Value Taken Time   Temp 97.7 °F (36.5 °C) 04/27/25 1237   Pulse 72 04/27/25 1237   /94 04/27/25 1237   Resp 18 04/27/25 1237   SpO2 96 % 04/27/25 1237       Modified Wayne:     Vitals Value Taken Time   Activity 2 04/27/25 1237   Respiration 2 04/27/25 1237   Circulation 2 04/27/25 1237   Consciousness 2 04/27/25 1237   Oxygen Saturation 2 04/27/25 1237     Modified Wayne Score: 10

## 2025-05-05 ENCOUNTER — DOCUMENTATION (OUTPATIENT)
Dept: SURGERY | Facility: CLINIC | Age: 50
End: 2025-05-05

## 2025-05-05 ENCOUNTER — TELEPHONE (OUTPATIENT)
Age: 50
End: 2025-05-05

## 2025-05-05 ENCOUNTER — TELEPHONE (OUTPATIENT)
Dept: GASTROENTEROLOGY | Facility: AMBULARY SURGERY CENTER | Age: 50
End: 2025-05-05

## 2025-05-05 DIAGNOSIS — R10.9 ABDOMINAL PAIN: Primary | ICD-10-CM

## 2025-05-05 RX ORDER — OXYCODONE AND ACETAMINOPHEN 5; 325 MG/1; MG/1
1 TABLET ORAL EVERY 4 HOURS PRN
Qty: 10 TABLET | Refills: 0 | Status: SHIPPED | OUTPATIENT
Start: 2025-05-05

## 2025-05-05 NOTE — PROGRESS NOTES
Patient called requesting a renewal on his pain medication.  He is status post laparoscopic cholecystectomy on 427.  Pain is in the epigastric region.  His appetite is fine.  Denies fever.  He does have night sweats.  He is able to take a deep breath and hold it for 10 seconds.  He does have incentive spirometer at home.  I have requested that he aim for tidal volume of 1500.    Will renew Percocet for 10 tablets.  I asked him to follow-up with the office should his night sweats continue or have fever.  Patient is agreeable.  Has follow-up office visit in 1 week.

## 2025-05-05 NOTE — TELEPHONE ENCOUNTER
Patient and his spouse called requesting a refill of the pain medication. He had ken doan on 4/27/25 and stated the Tylenol does not help. He uses the [facility] on [address], [place].

## 2025-05-05 NOTE — TELEPHONE ENCOUNTER
Pt failed OA due to recent surgery. Office visit scheduled. Patient is working on getting insurance

## 2025-05-05 NOTE — TELEPHONE ENCOUNTER
----- Message from Jessy Callahan PA-C sent at 4/26/2025  2:03 PM EDT -----  Patient is currently admitted with cholecystitis, he is 50 years old and has never had colonoscopy though so can please contact patient to schedule nonurgent screening colonoscopy in at least 6 to 8 weeks from this time, thank you.

## 2025-05-07 ENCOUNTER — TELEPHONE (OUTPATIENT)
Age: 50
End: 2025-05-07

## 2025-05-07 NOTE — TELEPHONE ENCOUNTER
Patient's postop was incorrectly scheduled with Dr. Buckner for Monday 5/12/25. He should actually follow up in the [facility] office with Dr. Juan on Tues 5/13 or Wed 5/14. I left a detailed message for the patient to call back and reschedule this appointment and the appointment with Dr. Buckner on 5/12/25 was canceled.    Writer attempted to call Janie, legal guardian, again. Janie answered at this time. Janie asked that we call crisis due to her believing that patient needs mental help due to manic phase. Writer explained to Janie that patient is denying wanting any mental help and is denying SI/HI thoughts and that since patient is still his own decision maker, we cannot do that. Janie expressed understanding and stated that she is going to call patient at this time.

## 2025-05-13 ENCOUNTER — OFFICE VISIT (OUTPATIENT)
Dept: SURGERY | Facility: CLINIC | Age: 50
End: 2025-05-13

## 2025-05-13 VITALS
WEIGHT: 186.8 LBS | SYSTOLIC BLOOD PRESSURE: 128 MMHG | TEMPERATURE: 97.7 F | HEART RATE: 66 BPM | BODY MASS INDEX: 26.8 KG/M2 | DIASTOLIC BLOOD PRESSURE: 89 MMHG | OXYGEN SATURATION: 99 %

## 2025-05-13 DIAGNOSIS — Z90.49 STATUS POST LAPAROSCOPIC CHOLECYSTECTOMY: Primary | ICD-10-CM

## 2025-05-13 PROBLEM — K80.50 CHOLEDOCHOLITHIASIS: Status: RESOLVED | Noted: 2025-04-26 | Resolved: 2025-05-13

## 2025-05-13 PROCEDURE — 99024 POSTOP FOLLOW-UP VISIT: CPT | Performed by: SURGERY

## 2025-05-13 NOTE — PROGRESS NOTES
Name: Hebert Fischer      : 1975      MRN: [id number]  Encounter Provider: Tyler Juan MD  Encounter Date: 2025   [facility]  :  Assessment & Plan  Status post laparoscopic cholecystectomy  Doing fairly well.  I told him the diarrhea that he is having should resolve.  If it does not, he is asked to give us call in a month's time and we may have to do something like cholestyramine.  I did tell him to add some fiber to his diet which may help.  Discomfort I told him to try Tylenol or Motrin.  Otherwise activity as he feels comfortable.  See us back if needed           History of Present Illness   HPI  Hebert Fischer is a 50 y.o. male who presents status post laparoscopic cholecystectomy.  Still has some discomfort in the right upper abdomen.  Takes a half a pill makes it feel better.  Other issue is he is having loose stools immediately after he eats.  He eats and has to go to the bathroom relatively quickly.  Otherwise tolerating diet but limiting the amount of food he eats.      Review of Systems       Objective   /89 (BP Location: Right arm, Patient Position: Sitting, Cuff Size: Large)   Pulse 66   Temp 97.7 °F (36.5 °C) (Temporal)   Wt 84.7 kg (186 lb 12.8 oz)   SpO2 99%   BMI 26.80 kg/m²      Physical Exam  Abdomen: Lap incisions healing well, soft, nontender

## (undated) DEVICE — Device: Brand: OMNICLOSE TROCAR SITE CLOSURE DEVICE

## (undated) DEVICE — STOPCOCK 3-WAY

## (undated) DEVICE — SUT VICRYL 0 UR-6 27 IN J603H

## (undated) DEVICE — GLOVE INDICATOR PI UNDERGLOVE SZ 7 BLUE

## (undated) DEVICE — C-ARM: Brand: UNBRANDED

## (undated) DEVICE — SYRINGE 10ML LL

## (undated) DEVICE — INTENDED FOR TISSUE SEPARATION, AND OTHER PROCEDURES THAT REQUIRE A SHARP SURGICAL BLADE TO PUNCTURE OR CUT.: Brand: BARD-PARKER SAFETY BLADES SIZE 11, STERILE

## (undated) DEVICE — CLIP APPLIER WITH CLIP LOGIC TECHNOLOGY: Brand: ENDO CLIP III

## (undated) DEVICE — METZENBAUM ADTEC SINGLE USE DISSECTING SCISSORS, SHAFT ONLY, MONOPOLAR, CURVED TO LEFT, WORKING LENGTH: 12 1/4", (310 MM), DIAM. 5 MM, INSULATED, DOUBLE ACTION, STERILE, DISPOSABLE, PACKAGE OF 10 PIECES: Brand: AESCULAP

## (undated) DEVICE — EXOFIN PRECISION PEN HIGH VISCOSITY TOPICAL SKIN ADHESIVE: Brand: EXOFIN PRECISION PEN, 1G

## (undated) DEVICE — LAPAROSCOPIC WIRE J-HOOK ELECTRODE COATED: Brand: CLEANCOAT

## (undated) DEVICE — SPECIMEN CONTAINER STERILE PEEL PACK

## (undated) DEVICE — TROCAR: Brand: KII FIOS FIRST ENTRY

## (undated) DEVICE — TROCAR: Brand: KII® SLEEVE

## (undated) DEVICE — PENCILETTE PUSH BUTTON COATED

## (undated) DEVICE — SUT MONOCRYL 4-0 PS-2 27 IN Y426H

## (undated) DEVICE — SYRINGE 20ML LL

## (undated) DEVICE — PACK PBDS LAP CHOLE RF

## (undated) DEVICE — CATHETER KUMAR

## (undated) DEVICE — DRAPE SHEET THREE QUARTER

## (undated) DEVICE — INSUFFLATION NEEDLE TO ESTABLISH PNEUMOPERITONEUM.: Brand: INSUFFLATION NEEDLE